# Patient Record
Sex: MALE | Race: WHITE | NOT HISPANIC OR LATINO | Employment: OTHER | URBAN - METROPOLITAN AREA
[De-identification: names, ages, dates, MRNs, and addresses within clinical notes are randomized per-mention and may not be internally consistent; named-entity substitution may affect disease eponyms.]

---

## 2017-01-03 ENCOUNTER — ALLSCRIPTS OFFICE VISIT (OUTPATIENT)
Dept: OTHER | Facility: OTHER | Age: 82
End: 2017-01-03

## 2017-01-03 ENCOUNTER — HOSPITAL ENCOUNTER (OUTPATIENT)
Dept: RADIOLOGY | Facility: CLINIC | Age: 82
Discharge: HOME | End: 2017-01-03
Payer: MEDICARE

## 2017-01-03 ENCOUNTER — TRANSCRIBE ORDERS (OUTPATIENT)
Dept: ADMINISTRATIVE | Facility: HOSPITAL | Age: 82
End: 2017-01-03

## 2017-01-03 DIAGNOSIS — M25.531 PAIN IN RIGHT WRIST: ICD-10-CM

## 2017-01-03 DIAGNOSIS — R20.2 PARESTHESIA: Primary | ICD-10-CM

## 2017-01-03 PROCEDURE — 73110 X-RAY EXAM OF WRIST: CPT

## 2017-02-01 ENCOUNTER — OFFICE VISIT (OUTPATIENT)
Dept: RADIOLOGY | Facility: CLINIC | Age: 82
End: 2017-02-01
Payer: MEDICARE

## 2017-02-01 DIAGNOSIS — R20.2 PARESTHESIA: ICD-10-CM

## 2017-02-01 PROCEDURE — 95886 MUSC TEST DONE W/N TEST COMP: CPT

## 2017-02-01 PROCEDURE — 95908 NRV CNDJ TST 3-4 STUDIES: CPT

## 2017-03-21 ENCOUNTER — ALLSCRIPTS OFFICE VISIT (OUTPATIENT)
Dept: OTHER | Facility: OTHER | Age: 82
End: 2017-03-21

## 2017-07-12 ENCOUNTER — TRANSCRIBE ORDERS (OUTPATIENT)
Dept: LAB | Facility: CLINIC | Age: 82
End: 2017-07-12

## 2017-07-12 ENCOUNTER — APPOINTMENT (OUTPATIENT)
Dept: LAB | Facility: CLINIC | Age: 82
End: 2017-07-12
Payer: MEDICARE

## 2017-07-12 DIAGNOSIS — E53.8 OTHER B-COMPLEX DEFICIENCIES: Primary | ICD-10-CM

## 2017-07-12 DIAGNOSIS — E53.8 OTHER B-COMPLEX DEFICIENCIES: ICD-10-CM

## 2017-07-12 LAB
FOLATE SERPL-MCNC: >20 NG/ML (ref 3.1–17.5)
VIT B12 SERPL-MCNC: 732 PG/ML (ref 100–900)

## 2017-07-12 PROCEDURE — 82607 VITAMIN B-12: CPT

## 2017-07-12 PROCEDURE — 36415 COLL VENOUS BLD VENIPUNCTURE: CPT

## 2017-07-12 PROCEDURE — 82746 ASSAY OF FOLIC ACID SERUM: CPT

## 2017-07-19 ENCOUNTER — ALLSCRIPTS OFFICE VISIT (OUTPATIENT)
Dept: OTHER | Facility: OTHER | Age: 82
End: 2017-07-19

## 2017-11-21 ENCOUNTER — TRANSCRIBE ORDERS (OUTPATIENT)
Dept: LAB | Facility: CLINIC | Age: 82
End: 2017-11-21

## 2017-11-21 ENCOUNTER — APPOINTMENT (OUTPATIENT)
Dept: LAB | Facility: CLINIC | Age: 82
End: 2017-11-21
Payer: MEDICARE

## 2017-11-21 DIAGNOSIS — D50.0 IRON DEFICIENCY ANEMIA DUE TO CHRONIC BLOOD LOSS: Primary | ICD-10-CM

## 2017-11-21 LAB
BASOPHILS # BLD AUTO: 0.01 THOUSANDS/ΜL (ref 0–0.1)
BASOPHILS NFR BLD AUTO: 0 % (ref 0–1)
EOSINOPHIL # BLD AUTO: 0.2 THOUSAND/ΜL (ref 0–0.61)
EOSINOPHIL NFR BLD AUTO: 4 % (ref 0–6)
ERYTHROCYTE [DISTWIDTH] IN BLOOD BY AUTOMATED COUNT: 18.4 % (ref 11.6–15.1)
HCT VFR BLD AUTO: 34.7 % (ref 36.5–49.3)
HGB BLD-MCNC: 12.1 G/DL (ref 12–17)
LYMPHOCYTES # BLD AUTO: 1.49 THOUSANDS/ΜL (ref 0.6–4.47)
LYMPHOCYTES NFR BLD AUTO: 29 % (ref 14–44)
MCH RBC QN AUTO: 39.5 PG (ref 26.8–34.3)
MCHC RBC AUTO-ENTMCNC: 34.9 G/DL (ref 31.4–37.4)
MCV RBC AUTO: 113 FL (ref 82–98)
MONOCYTES # BLD AUTO: 0.52 THOUSAND/ΜL (ref 0.17–1.22)
MONOCYTES NFR BLD AUTO: 10 % (ref 4–12)
NEUTROPHILS # BLD AUTO: 2.89 THOUSANDS/ΜL (ref 1.85–7.62)
NEUTS SEG NFR BLD AUTO: 57 % (ref 43–75)
NRBC BLD AUTO-RTO: 1 /100 WBCS
PLATELET # BLD AUTO: 211 THOUSANDS/UL (ref 149–390)
PMV BLD AUTO: 11.1 FL (ref 8.9–12.7)
RBC # BLD AUTO: 3.06 MILLION/UL (ref 3.88–5.62)
WBC # BLD AUTO: 5.15 THOUSAND/UL (ref 4.31–10.16)

## 2017-11-21 PROCEDURE — 36415 COLL VENOUS BLD VENIPUNCTURE: CPT

## 2017-11-21 PROCEDURE — 85025 COMPLETE CBC W/AUTO DIFF WBC: CPT

## 2018-01-11 NOTE — PROGRESS NOTES
Assessment  Assessed    1  Benign essential hypertension (401 1) (I10)   2  Hypothyroidism (244 9) (E03 9)   3  Hyperlipidemia (272 4) (E78 5)   4  Carpal tunnel syndrome, right (354 0) (G56 01)    Plan  #1 hypertension: Blood pressure controlled with current medications  Monitor electrolytes and renal function  #2 hypothyroidism; stable  Maintained on Synthroid  Monitor TSH  #3 hyperlipidemia; patient refuses statin  Continue fish oil    #4 right carpal tunnel syndrome; stable  Much improved after surgical repair  Discussion/Summary      Counseling Documentation With Imm: The patient was counseled regarding patient and family education, impressions, risks and benefits of treatment options, importance of compliance with treatment  total time of encounter was 40 minutes and 25 minutes was spent counseling  History of Present Illness  Hypothyroidism (Follow-Up): The patient reports doing well  He has had no significant interval events  The patient is currently asymptomatic  Medications include levothyroxine  Medications:  the patient is adherent to his medication regimen, but he denies medication side effects  Carpal Tunnel Syndrome (Brief): The patient is being seen for a routine clinic follow-up of carpal tunnel syndrome  Symptoms are in the right wrist  The patient is currently asymptomatic  No associated symptoms are reported  Hypertension (Follow-Up): The patient presents for follow-up of essential hypertension  The patient states he has been doing well with his blood pressure control since the last visit  He has no comorbid illnesses  He has no significant interval events  Symptoms: The patient is currently asymptomatic  Associated symptoms include no headache, no focal neurologic deficits and no memory loss  Medications: the patient is adherent with his medication regimen  He denies medication side effects  Medication(s): a beta blocking agent and a calcium channel blocker  Review of Systems  Complete-Male:   Constitutional: No fever or chills, feels well, no tiredness, no recent weight gain or weight loss  Eyes: No complaints of eye pain, no red eyes, no discharge from eyes, no itchy eyes  ENT: no complaints of earache, no hearing loss, no nosebleeds, no nasal discharge, no sore throat, no hoarseness  Cardiovascular: No complaints of slow heart rate, no fast heart rate, no chest pain, no palpitations, no leg claudication, no lower extremity  Respiratory: No complaints of shortness of breath, no wheezing, no cough, no SOB on exertion, no orthopnea or PND  Gastrointestinal: No complaints of abdominal pain, no constipation, no nausea or vomiting, no diarrhea or bloody stools  Genitourinary: No complaints of dysuria, no incontinence, no hesitancy, no nocturia, no genital lesion, no testicular pain  Musculoskeletal: No complaints of arthralgia, no myalgias, no joint swelling or stiffness, no limb pain or swelling  Integumentary: No complaints of skin rash or skin lesions, no itching, no skin wound, no dry skin  Neurological: No compliants of headache, no confusion, no convulsions, no numbness or tingling, no dizziness or fainting, no limb weakness, no difficulty walking  Psychiatric: Is not suicidal, no sleep disturbances, no anxiety or depression, no change in personality, no emotional problems  Endocrine: No complaints of proptosis, no hot flashes, no muscle weakness, no erectile dysfunction, no deepening of the voice, no feelings of weakness  Hematologic/Lymphatic: No complaints of swollen glands, no swollen glands in the neck, does not bleed easily, no easy bruising  Active Problems  Problems    1  Carpal tunnel syndrome, right (354 0) (G56 01)   2  Paresthesia of hand (782 0) (R20 2)   3  Right wrist pain (719 43) (M25 531)    Surgical History  Problems    1  History of Hernia Repair    Social History  Problems    · Never a smoker    Current Meds   1  AmLODIPine Besylate 5 MG Oral Tablet Recorded   2  Atenolol 50 MG Oral Tablet Recorded   3  Calcium 500 MG Oral Tablet Chewable; Therapy: [id number] to Recorded   4  Centrum Silver Adult 50+ Oral Tablet; Therapy: [id number] to Recorded   5  Garlic 7088 MG Oral Capsule; Therapy: 06ZBA2301 to Recorded   6  Magnesium 400 MG Oral Tablet; Therapy: 94KXY2906 to Recorded   7  Omega-3 Fish Oil 300 MG Oral Capsule; Therapy: 54XNY7571 to Recorded   8  Synthroid 75 MCG Oral Tablet Recorded   9  Vitamin C 500 MG Oral Tablet; Therapy: 49HXP3104 to Recorded   10  Vitamin E 400 UNIT Oral Capsule; Therapy: 57BUJ3672 to Recorded    Allergies  Medication    1  Penicillins    Vitals  Signs   Recorded: [id number] 02:12PM   Temperature: 97 5 F  Heart Rate: 58  Respiration: 16  Systolic: 409  Diastolic: 68  Height: 5 ft 8 in  Weight: 185 lb   BMI Calculated: 28 13  BSA Calculated: 1 98  O2 Saturation: 95    Physical Exam  General Complete Exam (Brief):   Constitutional   General appearance: No acute distress, well appearing and well nourished  Eyes   Conjunctiva and lids: No swelling, erythema, or discharge  Pupils and irises: Equal, round and reactive to light  Ears, Nose, Mouth, and Throat   External inspection of ears and nose: Normal     Otoscopic examination: Tympanic membrance translucent with normal light reflex  Canals patent without erythema  Oropharynx: Normal with no erythema, edema, exudate or lesions  Pulmonary   Respiratory effort: No increased work of breathing or signs of respiratory distress  Auscultation of lungs: Clear to auscultation  Cardiovascular   Auscultation of heart: Normal rate and rhythm, normal S1 and S2, without murmurs  Examination of extremities for edema and/or varicosities: Normal     Abdomen   Abdomen: Non-tender, no masses  Liver and spleen: No hepatomegaly or splenomegaly  Lymphatic   Palpation of lymph nodes in neck: No lymphadenopathy      Musculoskeletal Gait and station: Normal     Digits and nails: Normal without clubbing or cyanosis  Inspection/palpation of joints, bones, and muscles: Normal     Skin   Skin and subcutaneous tissue: Normal without rashes or lesions  Neurologic   Cranial nerves: Cranial nerves 2-12 intact  Reflexes: 2+ and symmetric  Sensation: No sensory loss      Psychiatric   Orientation to person, place and time: Normal        Signatures   Electronically signed by : Shelia Chatterjee DO; Jul 19 2017  4:31PM EST                       (Author)

## 2018-01-12 VITALS
BODY MASS INDEX: 26.52 KG/M2 | WEIGHT: 175 LBS | HEART RATE: 59 BPM | DIASTOLIC BLOOD PRESSURE: 72 MMHG | SYSTOLIC BLOOD PRESSURE: 123 MMHG | HEIGHT: 68 IN

## 2018-01-13 VITALS
OXYGEN SATURATION: 95 % | WEIGHT: 185 LBS | BODY MASS INDEX: 28.04 KG/M2 | SYSTOLIC BLOOD PRESSURE: 126 MMHG | RESPIRATION RATE: 16 BRPM | TEMPERATURE: 97.5 F | HEART RATE: 58 BPM | DIASTOLIC BLOOD PRESSURE: 68 MMHG | HEIGHT: 68 IN

## 2018-01-14 VITALS
HEIGHT: 68 IN | SYSTOLIC BLOOD PRESSURE: 131 MMHG | WEIGHT: 180 LBS | BODY MASS INDEX: 27.28 KG/M2 | DIASTOLIC BLOOD PRESSURE: 74 MMHG | HEART RATE: 59 BPM

## 2018-04-20 ENCOUNTER — OFFICE VISIT (OUTPATIENT)
Dept: UROLOGY | Facility: CLINIC | Age: 83
End: 2018-04-20
Payer: MEDICARE

## 2018-04-20 VITALS
SYSTOLIC BLOOD PRESSURE: 120 MMHG | DIASTOLIC BLOOD PRESSURE: 70 MMHG | BODY MASS INDEX: 30.22 KG/M2 | HEIGHT: 66 IN | WEIGHT: 188 LBS

## 2018-04-20 DIAGNOSIS — N40.0 BENIGN PROSTATIC HYPERPLASIA WITHOUT LOWER URINARY TRACT SYMPTOMS: Primary | ICD-10-CM

## 2018-04-20 LAB
SL AMB  POCT GLUCOSE, UA: NORMAL
SL AMB LEUKOCYTE ESTERASE,UA: NORMAL
SL AMB POCT BLOOD,UA: NORMAL
SL AMB POCT CLARITY,UA: CLEAR
SL AMB POCT COLOR,UA: YELLOW
SL AMB POCT KETONES,UA: NORMAL
SL AMB POCT NITRITE,UA: NORMAL
SL AMB POCT PH,UA: 7.5
SL AMB POCT URINE PROTEIN: NORMAL

## 2018-04-20 PROCEDURE — 81002 URINALYSIS NONAUTO W/O SCOPE: CPT | Performed by: UROLOGY

## 2018-04-20 PROCEDURE — 99213 OFFICE O/P EST LOW 20 MIN: CPT | Performed by: UROLOGY

## 2018-04-20 RX ORDER — FOLIC ACID/MULTIVIT,IRON,MINER 0.4MG-18MG
TABLET ORAL
COMMUNITY

## 2018-04-20 NOTE — PROGRESS NOTES
UROLOGY PROGRESS NOTE   Patient Identifiers: Holger Almonte (MRN [id number])  Date of Service: 4/20/2018    Subjective:    80year old male hx of BPH  Urine clear  AUA index score 8  Very minor urge leak  Only gets up once at night  Patient has  no complaints  Objective:     VITALS:    Vitals:    04/20/18 1026   BP: 120/70           LABS:  Lab Results   Component Value Date    HGB 12 1 11/21/2017    HCT 34 7 (L) 11/21/2017    WBC 5 15 11/21/2017     11/21/2017   ]    Lab Results   Component Value Date     02/29/2016    K 4 0 02/29/2016     02/29/2016    CO2 32 02/29/2016    BUN 10 02/29/2016    CREATININE 0 80 02/29/2016    CALCIUM 8 2 (L) 02/29/2016    GLUCOSE 106 02/29/2016   ]    INPATIENT MEDS:    Current Outpatient Prescriptions:     amLODIPine (NORVASC) 5 mg tablet, Take 5 mg by mouth every morning , Disp: , Rfl:     ascorbic acid (VITAMIN C) 500 mg tablet, Take 500 mg by mouth daily  , Disp: , Rfl:     atenolol (TENORMIN) 50 mg tablet, Take 50 mg by mouth every morning , Disp: , Rfl:     calcium-vitamin D (OSCAL) 250-125 MG-UNIT per tablet, Take 1 tablet by mouth daily  , Disp: , Rfl:     GARLIC PO, Take 1 tablet by mouth daily  , Disp: , Rfl:     levothyroxine 75 mcg tablet, Take 75 mcg by mouth daily  , Disp: , Rfl:     Magnesium 400 MG CAPS, Take 1 tablet by mouth , Disp: , Rfl:     Multiple Vitamins-Minerals (CENTRUM ADULTS PO), Take 1 tablet by mouth daily  , Disp: , Rfl:     Omega-3 Fatty Acids (OMEGA-3 FISH OIL) 1200 MG CAPS, Take by mouth, Disp: , Rfl:     vitamin E, tocopherol, 400 units capsule, Take 400 Units by mouth daily  , Disp: , Rfl:       Physical Exam:   /70 (BP Location: Right arm, Patient Position: Sitting, Cuff Size: Adult)   Ht 5' 6" (1 676 m)   Wt 85 3 kg (188 lb)   BMI 30 34 kg/m²   GEN: no acute distress    RESP: breathing comfortably with no accessory muscle use    ABD: soft, non-tender, non-distended   INCISION: `   EXT: no significant peripheral edema   (Male): Penis circumcised, phallus normal, meatus patent  Testicles descended into scrotum bilaterally without masses nor tenderness  No inguinal hernias bilaterally  LISSETTE: Prostate is not enlarged at 30 grams  The prostate is not boggy  The prostate is not tender  No nodules noted      RADIOLOGY:   none     Assessment:   1   BPH     Plan:   - follow up one year for annual exam  -  -  -

## 2018-05-15 ENCOUNTER — TRANSCRIBE ORDERS (OUTPATIENT)
Dept: LAB | Facility: CLINIC | Age: 83
End: 2018-05-15

## 2018-05-15 ENCOUNTER — APPOINTMENT (OUTPATIENT)
Dept: LAB | Facility: CLINIC | Age: 83
End: 2018-05-15
Payer: MEDICARE

## 2018-05-15 DIAGNOSIS — E03.4 IDIOPATHIC ATROPHIC HYPOTHYROIDISM: ICD-10-CM

## 2018-05-15 DIAGNOSIS — I10 ESSENTIAL HYPERTENSION, MALIGNANT: ICD-10-CM

## 2018-05-15 DIAGNOSIS — Z23 NEED FOR PROPHYLACTIC VACCINATION AND INOCULATION AGAINST CHOLERA ALONE: ICD-10-CM

## 2018-05-15 DIAGNOSIS — Z23 NEED FOR PROPHYLACTIC VACCINATION AND INOCULATION AGAINST CHOLERA ALONE: Primary | ICD-10-CM

## 2018-05-15 LAB
ALBUMIN SERPL BCP-MCNC: 3.8 G/DL (ref 3.5–5)
ALP SERPL-CCNC: 42 U/L (ref 46–116)
ALT SERPL W P-5'-P-CCNC: 30 U/L (ref 12–78)
ANION GAP SERPL CALCULATED.3IONS-SCNC: 8 MMOL/L (ref 4–13)
AST SERPL W P-5'-P-CCNC: 18 U/L (ref 5–45)
BASOPHILS # BLD AUTO: 0.01 THOUSANDS/ΜL (ref 0–0.1)
BASOPHILS NFR BLD AUTO: 0 % (ref 0–1)
BILIRUB SERPL-MCNC: 1.09 MG/DL (ref 0.2–1)
BUN SERPL-MCNC: 14 MG/DL (ref 5–25)
CALCIUM SERPL-MCNC: 8.6 MG/DL (ref 8.3–10.1)
CHLORIDE SERPL-SCNC: 103 MMOL/L (ref 100–108)
CHOLEST SERPL-MCNC: 119 MG/DL (ref 50–200)
CO2 SERPL-SCNC: 28 MMOL/L (ref 21–32)
CREAT SERPL-MCNC: 0.86 MG/DL (ref 0.6–1.3)
EOSINOPHIL # BLD AUTO: 0.52 THOUSAND/ΜL (ref 0–0.61)
EOSINOPHIL NFR BLD AUTO: 10 % (ref 0–6)
ERYTHROCYTE [DISTWIDTH] IN BLOOD BY AUTOMATED COUNT: 18.5 % (ref 11.6–15.1)
GFR SERPL CREATININE-BSD FRML MDRD: 77 ML/MIN/1.73SQ M
GLUCOSE P FAST SERPL-MCNC: 90 MG/DL (ref 65–99)
HCT VFR BLD AUTO: 36.3 % (ref 36.5–49.3)
HDLC SERPL-MCNC: 42 MG/DL (ref 40–60)
HGB BLD-MCNC: 12.1 G/DL (ref 12–17)
IMM GRANULOCYTES # BLD AUTO: 0.04 THOUSAND/UL (ref 0–0.2)
IMM GRANULOCYTES NFR BLD AUTO: 1 % (ref 0–2)
LDLC SERPL CALC-MCNC: 46 MG/DL (ref 0–100)
LYMPHOCYTES # BLD AUTO: 1.41 THOUSANDS/ΜL (ref 0.6–4.47)
LYMPHOCYTES NFR BLD AUTO: 26 % (ref 14–44)
MCH RBC QN AUTO: 38.4 PG (ref 26.8–34.3)
MCHC RBC AUTO-ENTMCNC: 33.3 G/DL (ref 31.4–37.4)
MCV RBC AUTO: 115 FL (ref 82–98)
MONOCYTES # BLD AUTO: 0.5 THOUSAND/ΜL (ref 0.17–1.22)
MONOCYTES NFR BLD AUTO: 9 % (ref 4–12)
NEUTROPHILS # BLD AUTO: 2.88 THOUSANDS/ΜL (ref 1.85–7.62)
NEUTS SEG NFR BLD AUTO: 54 % (ref 43–75)
NONHDLC SERPL-MCNC: 77 MG/DL
NRBC BLD AUTO-RTO: 1 /100 WBCS
PLATELET # BLD AUTO: 214 THOUSANDS/UL (ref 149–390)
PMV BLD AUTO: 11.2 FL (ref 8.9–12.7)
POTASSIUM SERPL-SCNC: 3.9 MMOL/L (ref 3.5–5.3)
PROT SERPL-MCNC: 6.9 G/DL (ref 6.4–8.2)
RBC # BLD AUTO: 3.15 MILLION/UL (ref 3.88–5.62)
SODIUM SERPL-SCNC: 139 MMOL/L (ref 136–145)
TRIGL SERPL-MCNC: 156 MG/DL
TSH SERPL DL<=0.05 MIU/L-ACNC: 5.81 UIU/ML (ref 0.36–3.74)
WBC # BLD AUTO: 5.36 THOUSAND/UL (ref 4.31–10.16)

## 2018-05-15 PROCEDURE — 85025 COMPLETE CBC W/AUTO DIFF WBC: CPT

## 2018-05-15 PROCEDURE — 84443 ASSAY THYROID STIM HORMONE: CPT

## 2018-05-15 PROCEDURE — 80053 COMPREHEN METABOLIC PANEL: CPT

## 2018-05-15 PROCEDURE — 36415 COLL VENOUS BLD VENIPUNCTURE: CPT

## 2018-05-15 PROCEDURE — 80061 LIPID PANEL: CPT

## 2018-07-10 ENCOUNTER — TRANSCRIBE ORDERS (OUTPATIENT)
Dept: LAB | Facility: CLINIC | Age: 83
End: 2018-07-10

## 2018-07-10 ENCOUNTER — APPOINTMENT (OUTPATIENT)
Dept: LAB | Facility: CLINIC | Age: 83
End: 2018-07-10
Payer: MEDICARE

## 2018-07-10 DIAGNOSIS — E03.9 MYXEDEMA HEART DISEASE: Primary | ICD-10-CM

## 2018-07-10 DIAGNOSIS — I51.9 MYXEDEMA HEART DISEASE: Primary | ICD-10-CM

## 2018-07-10 LAB — TSH SERPL DL<=0.05 MIU/L-ACNC: 2.34 UIU/ML (ref 0.36–3.74)

## 2018-07-10 PROCEDURE — 36415 COLL VENOUS BLD VENIPUNCTURE: CPT

## 2018-07-10 PROCEDURE — 84443 ASSAY THYROID STIM HORMONE: CPT

## 2018-10-02 ENCOUNTER — TELEPHONE (OUTPATIENT)
Dept: UROLOGY | Facility: CLINIC | Age: 83
End: 2018-10-02

## 2018-11-12 ENCOUNTER — TRANSCRIBE ORDERS (OUTPATIENT)
Dept: LAB | Facility: CLINIC | Age: 83
End: 2018-11-12

## 2018-11-12 ENCOUNTER — APPOINTMENT (OUTPATIENT)
Dept: LAB | Facility: CLINIC | Age: 83
End: 2018-11-12
Payer: MEDICARE

## 2018-11-12 DIAGNOSIS — Z51.81 ENCOUNTER FOR THERAPEUTIC DRUG MONITORING: ICD-10-CM

## 2018-11-12 DIAGNOSIS — E03.4 IDIOPATHIC ATROPHIC HYPOTHYROIDISM: Primary | ICD-10-CM

## 2018-11-12 DIAGNOSIS — I10 ESSENTIAL HYPERTENSION, MALIGNANT: ICD-10-CM

## 2018-11-12 DIAGNOSIS — E03.4 IDIOPATHIC ATROPHIC HYPOTHYROIDISM: ICD-10-CM

## 2018-11-12 LAB
ALBUMIN SERPL BCP-MCNC: 3.6 G/DL (ref 3.5–5)
ALP SERPL-CCNC: 39 U/L (ref 46–116)
ALT SERPL W P-5'-P-CCNC: 35 U/L (ref 12–78)
ANION GAP SERPL CALCULATED.3IONS-SCNC: 7 MMOL/L (ref 4–13)
AST SERPL W P-5'-P-CCNC: 18 U/L (ref 5–45)
BASOPHILS # BLD AUTO: 0.03 THOUSANDS/ΜL (ref 0–0.1)
BASOPHILS NFR BLD AUTO: 1 % (ref 0–1)
BILIRUB SERPL-MCNC: 0.9 MG/DL (ref 0.2–1)
BILIRUB UR QL STRIP: NEGATIVE
BUN SERPL-MCNC: 13 MG/DL (ref 5–25)
CALCIUM SERPL-MCNC: 8 MG/DL (ref 8.3–10.1)
CHLORIDE SERPL-SCNC: 103 MMOL/L (ref 100–108)
CHOLEST SERPL-MCNC: 119 MG/DL (ref 50–200)
CLARITY UR: NORMAL
CO2 SERPL-SCNC: 28 MMOL/L (ref 21–32)
COLOR UR: YELLOW
CREAT SERPL-MCNC: 0.77 MG/DL (ref 0.6–1.3)
EOSINOPHIL # BLD AUTO: 0.25 THOUSAND/ΜL (ref 0–0.61)
EOSINOPHIL NFR BLD AUTO: 6 % (ref 0–6)
ERYTHROCYTE [DISTWIDTH] IN BLOOD BY AUTOMATED COUNT: 18.1 % (ref 11.6–15.1)
GFR SERPL CREATININE-BSD FRML MDRD: 80 ML/MIN/1.73SQ M
GLUCOSE P FAST SERPL-MCNC: 103 MG/DL (ref 65–99)
GLUCOSE UR STRIP-MCNC: NEGATIVE MG/DL
HCT VFR BLD AUTO: 34.3 % (ref 36.5–49.3)
HDLC SERPL-MCNC: 40 MG/DL (ref 40–60)
HGB BLD-MCNC: 11.7 G/DL (ref 12–17)
HGB UR QL STRIP.AUTO: NEGATIVE
IMM GRANULOCYTES # BLD AUTO: 0.03 THOUSAND/UL (ref 0–0.2)
IMM GRANULOCYTES NFR BLD AUTO: 1 % (ref 0–2)
KETONES UR STRIP-MCNC: NEGATIVE MG/DL
LDLC SERPL CALC-MCNC: 47 MG/DL (ref 0–100)
LEUKOCYTE ESTERASE UR QL STRIP: NEGATIVE
LYMPHOCYTES # BLD AUTO: 1.19 THOUSANDS/ΜL (ref 0.6–4.47)
LYMPHOCYTES NFR BLD AUTO: 30 % (ref 14–44)
MCH RBC QN AUTO: 39.7 PG (ref 26.8–34.3)
MCHC RBC AUTO-ENTMCNC: 34.1 G/DL (ref 31.4–37.4)
MCV RBC AUTO: 116 FL (ref 82–98)
MONOCYTES # BLD AUTO: 0.44 THOUSAND/ΜL (ref 0.17–1.22)
MONOCYTES NFR BLD AUTO: 11 % (ref 4–12)
NEUTROPHILS # BLD AUTO: 2.04 THOUSANDS/ΜL (ref 1.85–7.62)
NEUTS SEG NFR BLD AUTO: 51 % (ref 43–75)
NITRITE UR QL STRIP: NEGATIVE
NONHDLC SERPL-MCNC: 79 MG/DL
NRBC BLD AUTO-RTO: 1 /100 WBCS
PH UR STRIP.AUTO: 7 [PH] (ref 4.5–8)
PLATELET # BLD AUTO: 189 THOUSANDS/UL (ref 149–390)
PMV BLD AUTO: 11.3 FL (ref 8.9–12.7)
POTASSIUM SERPL-SCNC: 3.9 MMOL/L (ref 3.5–5.3)
PROT SERPL-MCNC: 6.5 G/DL (ref 6.4–8.2)
PROT UR STRIP-MCNC: NEGATIVE MG/DL
RBC # BLD AUTO: 2.95 MILLION/UL (ref 3.88–5.62)
SODIUM SERPL-SCNC: 138 MMOL/L (ref 136–145)
SP GR UR STRIP.AUTO: 1.02 (ref 1–1.03)
TRIGL SERPL-MCNC: 160 MG/DL
TSH SERPL DL<=0.05 MIU/L-ACNC: 2.23 UIU/ML (ref 0.36–3.74)
UROBILINOGEN UR QL STRIP.AUTO: 0.2 E.U./DL
WBC # BLD AUTO: 3.98 THOUSAND/UL (ref 4.31–10.16)

## 2018-11-12 PROCEDURE — 36415 COLL VENOUS BLD VENIPUNCTURE: CPT

## 2018-11-12 PROCEDURE — 81003 URINALYSIS AUTO W/O SCOPE: CPT

## 2018-11-12 PROCEDURE — 80053 COMPREHEN METABOLIC PANEL: CPT

## 2018-11-12 PROCEDURE — 84443 ASSAY THYROID STIM HORMONE: CPT

## 2018-11-12 PROCEDURE — 85025 COMPLETE CBC W/AUTO DIFF WBC: CPT

## 2018-11-12 PROCEDURE — 80061 LIPID PANEL: CPT

## 2019-04-09 ENCOUNTER — TELEPHONE (OUTPATIENT)
Dept: UROLOGY | Facility: MEDICAL CENTER | Age: 84
End: 2019-04-09

## 2019-05-03 ENCOUNTER — OFFICE VISIT (OUTPATIENT)
Dept: UROLOGY | Facility: CLINIC | Age: 84
End: 2019-05-03
Payer: MEDICARE

## 2019-05-03 VITALS
BODY MASS INDEX: 28.64 KG/M2 | HEART RATE: 60 BPM | HEIGHT: 68 IN | DIASTOLIC BLOOD PRESSURE: 68 MMHG | WEIGHT: 189 LBS | SYSTOLIC BLOOD PRESSURE: 124 MMHG

## 2019-05-03 DIAGNOSIS — R39.12 BENIGN PROSTATIC HYPERPLASIA WITH WEAK URINARY STREAM: ICD-10-CM

## 2019-05-03 DIAGNOSIS — N40.1 BENIGN PROSTATIC HYPERPLASIA WITH WEAK URINARY STREAM: ICD-10-CM

## 2019-05-03 DIAGNOSIS — N48.1 BALANITIS: Primary | ICD-10-CM

## 2019-05-03 PROCEDURE — 99213 OFFICE O/P EST LOW 20 MIN: CPT | Performed by: UROLOGY

## 2019-05-03 RX ORDER — CLOTRIMAZOLE AND BETAMETHASONE DIPROPIONATE 10; .64 MG/G; MG/G
CREAM TOPICAL 2 TIMES DAILY
Qty: 30 G | Refills: 1 | Status: SHIPPED | OUTPATIENT
Start: 2019-05-03 | End: 2020-06-23

## 2019-07-15 ENCOUNTER — TRANSCRIBE ORDERS (OUTPATIENT)
Dept: LAB | Facility: CLINIC | Age: 84
End: 2019-07-15

## 2019-07-15 ENCOUNTER — APPOINTMENT (OUTPATIENT)
Dept: LAB | Facility: CLINIC | Age: 84
End: 2019-07-15
Payer: MEDICARE

## 2019-07-15 DIAGNOSIS — Z51.81 ENCOUNTER FOR THERAPEUTIC DRUG MONITORING: ICD-10-CM

## 2019-07-15 DIAGNOSIS — E03.4 IDIOPATHIC ATROPHIC HYPOTHYROIDISM: ICD-10-CM

## 2019-07-15 DIAGNOSIS — I20.0 INTERMEDIATE CORONARY SYNDROME (HCC): Primary | ICD-10-CM

## 2019-07-15 DIAGNOSIS — I20.0 INTERMEDIATE CORONARY SYNDROME (HCC): ICD-10-CM

## 2019-07-15 LAB
BASOPHILS # BLD AUTO: 0.02 THOUSANDS/ΜL (ref 0–0.1)
BASOPHILS NFR BLD AUTO: 0 % (ref 0–1)
BILIRUB UR QL STRIP: NEGATIVE
CHOLEST SERPL-MCNC: 143 MG/DL (ref 50–200)
CLARITY UR: CLEAR
COLOR UR: NORMAL
EOSINOPHIL # BLD AUTO: 0.22 THOUSAND/ΜL (ref 0–0.61)
EOSINOPHIL NFR BLD AUTO: 4 % (ref 0–6)
ERYTHROCYTE [DISTWIDTH] IN BLOOD BY AUTOMATED COUNT: 18.2 % (ref 11.6–15.1)
GLUCOSE UR STRIP-MCNC: NEGATIVE MG/DL
HCT VFR BLD AUTO: 34.9 % (ref 36.5–49.3)
HDLC SERPL-MCNC: 51 MG/DL (ref 40–60)
HGB BLD-MCNC: 12.3 G/DL (ref 12–17)
HGB UR QL STRIP.AUTO: NEGATIVE
IMM GRANULOCYTES # BLD AUTO: 0.04 THOUSAND/UL (ref 0–0.2)
IMM GRANULOCYTES NFR BLD AUTO: 1 % (ref 0–2)
KETONES UR STRIP-MCNC: NEGATIVE MG/DL
LDLC SERPL CALC-MCNC: 67 MG/DL (ref 0–100)
LEUKOCYTE ESTERASE UR QL STRIP: NEGATIVE
LYMPHOCYTES # BLD AUTO: 1.37 THOUSANDS/ΜL (ref 0.6–4.47)
LYMPHOCYTES NFR BLD AUTO: 28 % (ref 14–44)
MCH RBC QN AUTO: 41 PG (ref 26.8–34.3)
MCHC RBC AUTO-ENTMCNC: 35.2 G/DL (ref 31.4–37.4)
MCV RBC AUTO: 116 FL (ref 82–98)
MONOCYTES # BLD AUTO: 0.37 THOUSAND/ΜL (ref 0.17–1.22)
MONOCYTES NFR BLD AUTO: 7 % (ref 4–12)
NEUTROPHILS # BLD AUTO: 2.95 THOUSANDS/ΜL (ref 1.85–7.62)
NEUTS SEG NFR BLD AUTO: 60 % (ref 43–75)
NITRITE UR QL STRIP: NEGATIVE
NONHDLC SERPL-MCNC: 92 MG/DL
NRBC BLD AUTO-RTO: 1 /100 WBCS
PH UR STRIP.AUTO: 7.5 [PH]
PLATELET # BLD AUTO: 184 THOUSANDS/UL (ref 149–390)
PMV BLD AUTO: 11.7 FL (ref 8.9–12.7)
PROT UR STRIP-MCNC: NEGATIVE MG/DL
RBC # BLD AUTO: 3 MILLION/UL (ref 3.88–5.62)
SP GR UR STRIP.AUTO: 1.02 (ref 1–1.03)
TRIGL SERPL-MCNC: 127 MG/DL
TSH SERPL DL<=0.05 MIU/L-ACNC: 3.19 UIU/ML (ref 0.36–3.74)
UROBILINOGEN UR QL STRIP.AUTO: 0.2 E.U./DL
WBC # BLD AUTO: 4.97 THOUSAND/UL (ref 4.31–10.16)

## 2019-07-15 PROCEDURE — 85025 COMPLETE CBC W/AUTO DIFF WBC: CPT

## 2019-07-15 PROCEDURE — 80061 LIPID PANEL: CPT

## 2019-07-15 PROCEDURE — 36415 COLL VENOUS BLD VENIPUNCTURE: CPT

## 2019-07-15 PROCEDURE — 84443 ASSAY THYROID STIM HORMONE: CPT

## 2019-07-15 PROCEDURE — 81003 URINALYSIS AUTO W/O SCOPE: CPT

## 2019-11-15 ENCOUNTER — APPOINTMENT (OUTPATIENT)
Dept: LAB | Facility: CLINIC | Age: 84
End: 2019-11-15
Payer: MEDICARE

## 2019-11-15 ENCOUNTER — TRANSCRIBE ORDERS (OUTPATIENT)
Dept: LAB | Facility: CLINIC | Age: 84
End: 2019-11-15

## 2019-11-15 DIAGNOSIS — E03.9 HYPOTHYROIDISM, UNSPECIFIED TYPE: ICD-10-CM

## 2019-11-15 DIAGNOSIS — Z51.81 ENCOUNTER FOR THERAPEUTIC DRUG MONITORING: ICD-10-CM

## 2019-11-15 DIAGNOSIS — I10 ESSENTIAL HYPERTENSION, MALIGNANT: ICD-10-CM

## 2019-11-15 DIAGNOSIS — E03.9 HYPOTHYROIDISM, UNSPECIFIED TYPE: Primary | ICD-10-CM

## 2019-11-15 LAB
ALBUMIN SERPL BCP-MCNC: 3.8 G/DL (ref 3.5–5)
ALP SERPL-CCNC: 46 U/L (ref 46–116)
ALT SERPL W P-5'-P-CCNC: 33 U/L (ref 12–78)
ANION GAP SERPL CALCULATED.3IONS-SCNC: 5 MMOL/L (ref 4–13)
AST SERPL W P-5'-P-CCNC: 18 U/L (ref 5–45)
BASOPHILS # BLD AUTO: 0.03 THOUSANDS/ΜL (ref 0–0.1)
BASOPHILS NFR BLD AUTO: 1 % (ref 0–1)
BILIRUB SERPL-MCNC: 0.91 MG/DL (ref 0.2–1)
BILIRUB UR QL STRIP: NEGATIVE
BUN SERPL-MCNC: 11 MG/DL (ref 5–25)
CALCIUM SERPL-MCNC: 8.7 MG/DL (ref 8.3–10.1)
CHLORIDE SERPL-SCNC: 104 MMOL/L (ref 100–108)
CHOLEST SERPL-MCNC: 132 MG/DL (ref 50–200)
CLARITY UR: CLEAR
CO2 SERPL-SCNC: 30 MMOL/L (ref 21–32)
COLOR UR: YELLOW
CREAT SERPL-MCNC: 0.74 MG/DL (ref 0.6–1.3)
EOSINOPHIL # BLD AUTO: 0.26 THOUSAND/ΜL (ref 0–0.61)
EOSINOPHIL NFR BLD AUTO: 5 % (ref 0–6)
ERYTHROCYTE [DISTWIDTH] IN BLOOD BY AUTOMATED COUNT: 18.1 % (ref 11.6–15.1)
GFR SERPL CREATININE-BSD FRML MDRD: 81 ML/MIN/1.73SQ M
GLUCOSE P FAST SERPL-MCNC: 102 MG/DL (ref 65–99)
GLUCOSE UR STRIP-MCNC: NEGATIVE MG/DL
HCT VFR BLD AUTO: 38 % (ref 36.5–49.3)
HDLC SERPL-MCNC: 45 MG/DL
HGB BLD-MCNC: 12.9 G/DL (ref 12–17)
HGB UR QL STRIP.AUTO: NEGATIVE
IMM GRANULOCYTES # BLD AUTO: 0.05 THOUSAND/UL (ref 0–0.2)
IMM GRANULOCYTES NFR BLD AUTO: 1 % (ref 0–2)
KETONES UR STRIP-MCNC: NEGATIVE MG/DL
LDLC SERPL CALC-MCNC: 61 MG/DL (ref 0–100)
LEUKOCYTE ESTERASE UR QL STRIP: NEGATIVE
LYMPHOCYTES # BLD AUTO: 1.32 THOUSANDS/ΜL (ref 0.6–4.47)
LYMPHOCYTES NFR BLD AUTO: 23 % (ref 14–44)
MCH RBC QN AUTO: 40.2 PG (ref 26.8–34.3)
MCHC RBC AUTO-ENTMCNC: 33.9 G/DL (ref 31.4–37.4)
MCV RBC AUTO: 118 FL (ref 82–98)
MONOCYTES # BLD AUTO: 0.47 THOUSAND/ΜL (ref 0.17–1.22)
MONOCYTES NFR BLD AUTO: 8 % (ref 4–12)
NEUTROPHILS # BLD AUTO: 3.68 THOUSANDS/ΜL (ref 1.85–7.62)
NEUTS SEG NFR BLD AUTO: 62 % (ref 43–75)
NITRITE UR QL STRIP: NEGATIVE
NONHDLC SERPL-MCNC: 87 MG/DL
NRBC BLD AUTO-RTO: 1 /100 WBCS
PH UR STRIP.AUTO: 8 [PH]
PLATELET # BLD AUTO: 199 THOUSANDS/UL (ref 149–390)
PMV BLD AUTO: 11.4 FL (ref 8.9–12.7)
POTASSIUM SERPL-SCNC: 4.1 MMOL/L (ref 3.5–5.3)
PROT SERPL-MCNC: 6.8 G/DL (ref 6.4–8.2)
PROT UR STRIP-MCNC: NEGATIVE MG/DL
RBC # BLD AUTO: 3.21 MILLION/UL (ref 3.88–5.62)
SODIUM SERPL-SCNC: 139 MMOL/L (ref 136–145)
SP GR UR STRIP.AUTO: 1.01 (ref 1–1.03)
TRIGL SERPL-MCNC: 131 MG/DL
TSH SERPL DL<=0.05 MIU/L-ACNC: 3.17 UIU/ML (ref 0.36–3.74)
UROBILINOGEN UR QL STRIP.AUTO: 0.2 E.U./DL
WBC # BLD AUTO: 5.81 THOUSAND/UL (ref 4.31–10.16)

## 2019-11-15 PROCEDURE — 80053 COMPREHEN METABOLIC PANEL: CPT

## 2019-11-15 PROCEDURE — 85025 COMPLETE CBC W/AUTO DIFF WBC: CPT

## 2019-11-15 PROCEDURE — 80061 LIPID PANEL: CPT

## 2019-11-15 PROCEDURE — 81003 URINALYSIS AUTO W/O SCOPE: CPT

## 2019-11-15 PROCEDURE — 84443 ASSAY THYROID STIM HORMONE: CPT

## 2019-11-15 PROCEDURE — 36415 COLL VENOUS BLD VENIPUNCTURE: CPT

## 2019-12-17 ENCOUNTER — OFFICE VISIT (OUTPATIENT)
Dept: UROLOGY | Facility: CLINIC | Age: 84
End: 2019-12-17
Payer: MEDICARE

## 2019-12-17 VITALS
WEIGHT: 188 LBS | SYSTOLIC BLOOD PRESSURE: 124 MMHG | HEART RATE: 64 BPM | HEIGHT: 68 IN | BODY MASS INDEX: 28.49 KG/M2 | DIASTOLIC BLOOD PRESSURE: 70 MMHG

## 2019-12-17 DIAGNOSIS — R39.15 URINARY URGENCY: Primary | ICD-10-CM

## 2019-12-17 DIAGNOSIS — R39.12 BENIGN PROSTATIC HYPERPLASIA WITH WEAK URINARY STREAM: ICD-10-CM

## 2019-12-17 DIAGNOSIS — N40.1 BENIGN PROSTATIC HYPERPLASIA WITH WEAK URINARY STREAM: ICD-10-CM

## 2019-12-17 PROCEDURE — 99213 OFFICE O/P EST LOW 20 MIN: CPT | Performed by: UROLOGY

## 2020-01-24 ENCOUNTER — TELEPHONE (OUTPATIENT)
Dept: DERMATOLOGY | Facility: CLINIC | Age: 85
End: 2020-01-24

## 2020-07-28 ENCOUNTER — TELEPHONE (OUTPATIENT)
Dept: UROLOGY | Facility: MEDICAL CENTER | Age: 85
End: 2020-07-28

## 2020-07-28 NOTE — TELEPHONE ENCOUNTER
This is a patient of Dr Mary Miller in [place]  Patient called and wants to see Melissa Pearce in [place] and I do not see any openings until October  Patient said he does not want to wait til  October  Please call patient back at [phone]

## 2020-09-21 NOTE — PROGRESS NOTES
UROLOGY PROGRESS NOTE   Patient Identifiers: Alejandro Che (MRN [id number])  Date of Service: 9/21/2020    Subjective:    80-year-old man with a history of BPH and urinary frequency  At his last visit he was complaining of some increased urinary symptoms and urgency and the feels that he needs to void  He had 1-2 times per night nocturia  No medications were added at that time  Reason for visit:  BPH follow-up    Objective:     VITALS:    There were no vitals filed for this visit  LABS:  Lab Results   Component Value Date    HGB 12 9 11/15/2019    HCT 38 0 11/15/2019    WBC 5 81 11/15/2019     11/15/2019   ]    Lab Results   Component Value Date    K 4 1 11/15/2019     11/15/2019    CO2 30 11/15/2019    BUN 11 11/15/2019    CREATININE 0 74 11/15/2019    CALCIUM 8 7 11/15/2019   ]        INPATIENT MEDS:    Current Outpatient Medications:     amLODIPine (NORVASC) 5 mg tablet, Take 5 mg by mouth every morning , Disp: , Rfl:     ascorbic acid (VITAMIN C) 500 mg tablet, Take 500 mg by mouth daily  , Disp: , Rfl:     atenolol (TENORMIN) 50 mg tablet, Take 50 mg by mouth every morning , Disp: , Rfl:     betamethasone valerate (VALISONE) 0 1 % cream, Apply topically 2 (two) times a day, Disp: 45 g, Rfl: 1    calcium-vitamin D (OSCAL) 250-125 MG-UNIT per tablet, Take 1 tablet by mouth daily  , Disp: , Rfl:     clotrimazole-betamethasone (LOTRISONE) 1-0 05 % cream, Apply topically 2 (two) times a day for 5 days, Disp: 30 g, Rfl: 1    fluticasone (FLONASE) 50 mcg/act nasal spray, 1 spray into each nostril daily, Disp: , Rfl:     GARLIC PO, Take 1 tablet by mouth daily  , Disp: , Rfl:     levothyroxine 75 mcg tablet, Take 75 mcg by mouth daily  , Disp: , Rfl:     Magnesium 400 MG CAPS, Take 1 tablet by mouth , Disp: , Rfl:     Multiple Vitamins-Minerals (CENTRUM ADULTS PO), Take 1 tablet by mouth daily  , Disp: , Rfl:     Omega-3 Fatty Acids (OMEGA-3 FISH OIL) 1200 MG CAPS, Take by mouth, Disp: , Rfl:     vitamin E, tocopherol, 400 units capsule, Take 400 Units by mouth daily  , Disp: , Rfl:       Physical Exam:   There were no vitals taken for this visit  GEN: no acute distress    RESP: breathing comfortably with no accessory muscle use    ABD: soft, non-tender, non-distended   INCISION:    EXT: no significant peripheral edema   (Male): Penis uncircumcised, phallus normal, meatus patent  Testicles descended into scrotum bilaterally without masses nor tenderness  No inguinal hernias bilaterally  LISSETTE: Prostate is not enlarged at 30 grams  The prostate is not boggy  The prostate is not tender  No nodules noted      RADIOLOGY:   None     Assessment:   1   BPH     Plan:   -he prefers to follow-up in 1 year for his annual exam  -  -  -

## 2020-09-22 ENCOUNTER — OFFICE VISIT (OUTPATIENT)
Dept: UROLOGY | Facility: CLINIC | Age: 85
End: 2020-09-22
Payer: MEDICARE

## 2020-09-22 VITALS
HEART RATE: 68 BPM | RESPIRATION RATE: 18 BRPM | TEMPERATURE: 97.9 F | DIASTOLIC BLOOD PRESSURE: 80 MMHG | HEIGHT: 69 IN | SYSTOLIC BLOOD PRESSURE: 144 MMHG | BODY MASS INDEX: 27.11 KG/M2 | WEIGHT: 183 LBS

## 2020-09-22 DIAGNOSIS — R39.12 BENIGN PROSTATIC HYPERPLASIA WITH WEAK URINARY STREAM: Primary | ICD-10-CM

## 2020-09-22 DIAGNOSIS — N40.1 BENIGN PROSTATIC HYPERPLASIA WITH WEAK URINARY STREAM: Primary | ICD-10-CM

## 2020-09-22 LAB — POST-VOID RESIDUAL VOLUME, ML POC: 16 ML

## 2020-09-22 PROCEDURE — 99213 OFFICE O/P EST LOW 20 MIN: CPT | Performed by: PHYSICIAN ASSISTANT

## 2020-09-22 PROCEDURE — 51798 US URINE CAPACITY MEASURE: CPT | Performed by: PHYSICIAN ASSISTANT

## 2020-09-29 ENCOUNTER — IMMUNIZATIONS (OUTPATIENT)
Dept: GERIATRICS | Facility: OTHER | Age: 85
End: 2020-09-29
Payer: MEDICARE

## 2020-09-29 DIAGNOSIS — Z23 ENCOUNTER FOR IMMUNIZATION: ICD-10-CM

## 2020-09-29 PROCEDURE — 90662 IIV NO PRSV INCREASED AG IM: CPT | Performed by: FAMILY MEDICINE

## 2020-09-29 PROCEDURE — G0008 ADMIN INFLUENZA VIRUS VAC: HCPCS | Performed by: FAMILY MEDICINE

## 2021-02-09 ENCOUNTER — IMMUNIZATIONS (OUTPATIENT)
Dept: FAMILY MEDICINE CLINIC | Facility: HOSPITAL | Age: 86
End: 2021-02-09

## 2021-02-09 DIAGNOSIS — Z23 ENCOUNTER FOR IMMUNIZATION: Primary | ICD-10-CM

## 2021-02-09 PROCEDURE — 91301 SARS-COV-2 / COVID-19 MRNA VACCINE (MODERNA) 100 MCG: CPT

## 2021-02-09 PROCEDURE — 0011A SARS-COV-2 / COVID-19 MRNA VACCINE (MODERNA) 100 MCG: CPT

## 2021-03-09 ENCOUNTER — IMMUNIZATIONS (OUTPATIENT)
Dept: FAMILY MEDICINE CLINIC | Facility: HOSPITAL | Age: 86
End: 2021-03-09

## 2021-03-09 DIAGNOSIS — Z23 ENCOUNTER FOR IMMUNIZATION: Primary | ICD-10-CM

## 2021-03-09 PROCEDURE — 91301 SARS-COV-2 / COVID-19 MRNA VACCINE (MODERNA) 100 MCG: CPT

## 2021-03-09 PROCEDURE — 0012A SARS-COV-2 / COVID-19 MRNA VACCINE (MODERNA) 100 MCG: CPT

## 2021-09-22 ENCOUNTER — OFFICE VISIT (OUTPATIENT)
Dept: UROLOGY | Facility: CLINIC | Age: 86
End: 2021-09-22
Payer: MEDICARE

## 2021-09-22 VITALS
HEART RATE: 59 BPM | DIASTOLIC BLOOD PRESSURE: 80 MMHG | BODY MASS INDEX: 27.99 KG/M2 | SYSTOLIC BLOOD PRESSURE: 118 MMHG | HEIGHT: 69 IN | WEIGHT: 189 LBS

## 2021-09-22 DIAGNOSIS — N40.1 BENIGN PROSTATIC HYPERPLASIA WITH LOWER URINARY TRACT SYMPTOMS, SYMPTOM DETAILS UNSPECIFIED: Primary | ICD-10-CM

## 2021-09-22 PROCEDURE — 99213 OFFICE O/P EST LOW 20 MIN: CPT | Performed by: PHYSICIAN ASSISTANT

## 2021-09-22 NOTE — PROGRESS NOTES
UROLOGY PROGRESS NOTE   Patient Identifiers: Briseida Ferrell (MRN [id number])  Date of Service: 9/22/2021    Subjective:      45-year-old man history of BPH and urinary frequency  He only gets up 1-2 times per night  He has some urgency with minor urge leak during day but otherwise no complaints  Reason for visit:  BPH follow-up      Objective:     VITALS:    Vitals:    09/22/21 1348   BP: 118/80   Pulse: 59     AUA SYMPTOM SCORE      Most Recent Value   AUA SYMPTOM SCORE   How often have you had a sensation of not emptying your bladder completely after you finished urinating? 2   How often have you had to urinate again less than two hours after you finished urinating? 4   How often have you found you stopped and started again several times when you urinate? 3   How often have you found it difficult to postpone urination? 3   How often have you had a weak urinary stream?  1   How often have you had to push or strain to begin urination? 0   How many times did you most typically get up to urinate from the time you went to bed at night until the time you got up in the morning? 2   Quality of Life: If you were to spend the rest of your life with your urinary condition just the way it is now, how would you feel about that?  3   AUA SYMPTOM SCORE  15            LABS:  Lab Results   Component Value Date    HGB 12 9 11/15/2019    HCT 38 0 11/15/2019    WBC 5 81 11/15/2019     11/15/2019   ]    Lab Results   Component Value Date    K 4 1 11/15/2019     11/15/2019    CO2 30 11/15/2019    BUN 11 11/15/2019    CREATININE 0 74 11/15/2019    CALCIUM 8 7 11/15/2019   ]        INPATIENT MEDS:    Current Outpatient Medications:     amLODIPine (NORVASC) 5 mg tablet, Take 5 mg by mouth every morning , Disp: , Rfl:     ascorbic acid (VITAMIN C) 500 mg tablet, Take 500 mg by mouth daily  , Disp: , Rfl:     atenolol (TENORMIN) 50 mg tablet, Take 50 mg by mouth every morning , Disp: , Rfl:     betamethasone valerate (VALISONE) 0 1 % cream, Apply topically 2 (two) times a day, Disp: 45 g, Rfl: 1    calcium-vitamin D (OSCAL) 250-125 MG-UNIT per tablet, Take 1 tablet by mouth daily  , Disp: , Rfl:     fluticasone (FLONASE) 50 mcg/act nasal spray, 1 spray into each nostril daily, Disp: , Rfl:     GARLIC PO, Take 1 tablet by mouth daily  , Disp: , Rfl:     levothyroxine 75 mcg tablet, Take 75 mcg by mouth daily  , Disp: , Rfl:     Magnesium 400 MG CAPS, Take 1 tablet by mouth , Disp: , Rfl:     Multiple Vitamins-Minerals (CENTRUM ADULTS PO), Take 1 tablet by mouth daily  , Disp: , Rfl:     Omega-3 Fatty Acids (OMEGA-3 FISH OIL) 1200 MG CAPS, Take by mouth, Disp: , Rfl:     vitamin E, tocopherol, 400 units capsule, Take 400 Units by mouth daily  , Disp: , Rfl:     clotrimazole-betamethasone (LOTRISONE) 1-0 05 % cream, Apply topically 2 (two) times a day for 5 days, Disp: 30 g, Rfl: 1      Physical Exam:   /80 (BP Location: Left arm, Patient Position: Sitting, Cuff Size: Adult)   Pulse 59   Ht 5' 9" (1 753 m)   Wt 85 7 kg (189 lb)   BMI 27 91 kg/m²   GEN: no acute distress    RESP: breathing comfortably with no accessory muscle use    ABD: soft, non-tender, non-distended   INCISION:    EXT: no significant peripheral edema   (Male): Penis uncircumcised, phallus normal, meatus patent  Testicles descended into scrotum bilaterally without masses nor tenderness  No inguinal hernias bilaterally  LISSETTE: Prostate is enlarged at 35 grams  The prostate is not boggy  The prostate is not tender  No nodules noted      RADIOLOGY:    none     Assessment:    1   BPH     Plan:   - follow-up in 1 year for his annual exam  - he is not on any medications  -  -

## 2021-10-08 ENCOUNTER — IMMUNIZATIONS (OUTPATIENT)
Dept: GERIATRICS | Facility: OTHER | Age: 86
End: 2021-10-08
Payer: MEDICARE

## 2021-10-08 DIAGNOSIS — Z23 ENCOUNTER FOR IMMUNIZATION: Primary | ICD-10-CM

## 2021-10-08 PROCEDURE — 90662 IIV NO PRSV INCREASED AG IM: CPT | Performed by: NURSE PRACTITIONER

## 2021-10-08 PROCEDURE — G0008 ADMIN INFLUENZA VIRUS VAC: HCPCS | Performed by: NURSE PRACTITIONER

## 2022-09-06 ENCOUNTER — OFFICE VISIT (OUTPATIENT)
Dept: UROLOGY | Facility: CLINIC | Age: 87
End: 2022-09-06
Payer: MEDICARE

## 2022-09-06 VITALS
OXYGEN SATURATION: 95 % | HEIGHT: 69 IN | BODY MASS INDEX: 27.7 KG/M2 | SYSTOLIC BLOOD PRESSURE: 128 MMHG | DIASTOLIC BLOOD PRESSURE: 68 MMHG | HEART RATE: 65 BPM | WEIGHT: 187 LBS

## 2022-09-06 DIAGNOSIS — N40.1 BPH WITH OBSTRUCTION/LOWER URINARY TRACT SYMPTOMS: Primary | ICD-10-CM

## 2022-09-06 DIAGNOSIS — N13.8 BPH WITH OBSTRUCTION/LOWER URINARY TRACT SYMPTOMS: Primary | ICD-10-CM

## 2022-09-06 PROCEDURE — 99213 OFFICE O/P EST LOW 20 MIN: CPT | Performed by: PHYSICIAN ASSISTANT

## 2022-09-06 RX ORDER — TAMSULOSIN HYDROCHLORIDE 0.4 MG/1
0.4 CAPSULE ORAL
Qty: 90 CAPSULE | Refills: 3 | Status: SHIPPED | OUTPATIENT
Start: 2022-09-06

## 2022-09-06 NOTE — PROGRESS NOTES
UROLOGY PROGRESS NOTE   Patient Identifiers: Tip Masterson (MRN [id number])  Date of Service: 9/6/2022    Subjective:    26-year-old man history of BPH with urinary frequency  He gets up 1-2 times per night has some minor leakage during the day  Reason for visit:  BPH follow-up    Objective:     VITALS:    Vitals:    09/06/22 1116   BP: 128/68   Pulse: 65   SpO2: 95%           LABS:  Lab Results   Component Value Date    HGB 12 9 11/15/2019    HCT 38 0 11/15/2019    WBC 5 81 11/15/2019     11/15/2019   ]    Lab Results   Component Value Date    K 4 1 11/15/2019     11/15/2019    CO2 30 11/15/2019    BUN 11 11/15/2019    CREATININE 0 74 11/15/2019    CALCIUM 8 7 11/15/2019   ]        INPATIENT MEDS:    Current Outpatient Medications:     amLODIPine (NORVASC) 5 mg tablet, Take 5 mg by mouth every morning , Disp: , Rfl:     ascorbic acid (VITAMIN C) 500 mg tablet, Take 500 mg by mouth daily  , Disp: , Rfl:     atenolol (TENORMIN) 50 mg tablet, Take 50 mg by mouth every morning , Disp: , Rfl:     betamethasone valerate (VALISONE) 0 1 % cream, Apply topically 2 (two) times a day, Disp: 45 g, Rfl: 1    calcium-vitamin D (OSCAL) 250-125 MG-UNIT per tablet, Take 1 tablet by mouth daily  , Disp: , Rfl:     Cholecalciferol 25 MCG (1000 UT) tablet, Take 1,000 Units by mouth daily, Disp: , Rfl:     Diclofenac Sodium (VOLTAREN) 1 %, Apply 1 application topically 4 (four) times a day, Disp: , Rfl:     fluticasone (FLONASE) 50 mcg/act nasal spray, 1 spray into each nostril daily As needed , Disp: , Rfl:     GARLIC PO, Take 1 tablet by mouth daily  , Disp: , Rfl:     Magnesium 400 MG CAPS, Take 1 tablet by mouth , Disp: , Rfl:     Multiple Vitamins-Minerals (CENTRUM ADULTS PO), Take 1 tablet by mouth daily  , Disp: , Rfl:     Omega-3 Fatty Acids (OMEGA-3 FISH OIL) 1200 MG CAPS, Take by mouth, Disp: , Rfl:     Synthroid 112 MCG tablet, , Disp: , Rfl:     tamsulosin (FLOMAX) 0 4 mg, Take 1 capsule (0 4 mg total) by mouth daily with dinner, Disp: 90 capsule, Rfl: 3    vitamin E, tocopherol, 400 units capsule, Take 400 Units by mouth daily  , Disp: , Rfl:     clotrimazole-betamethasone (LOTRISONE) 1-0 05 % cream, Apply topically 2 (two) times a day for 5 days, Disp: 30 g, Rfl: 1    levothyroxine 75 mcg tablet, Take 75 mcg by mouth daily  (Patient not taking: No sig reported), Disp: , Rfl:       Physical Exam:   /68   Pulse 65   Ht 5' 9" (1 753 m)   Wt 84 8 kg (187 lb)   SpO2 95%   BMI 27 62 kg/m²   GEN: no acute distress    RESP: breathing comfortably with no accessory muscle use    ABD: soft, non-tender, non-distended   INCISION:    EXT: no significant peripheral edema   (Male): Penis circumcised, phallus normal, meatus patent  Testicles descended into scrotum bilaterally without masses nor tenderness  No inguinal hernias bilaterally  LISSETTE: Prostate is enlarged at 35 grams  The prostate is not boggy  The prostate is not tender  No nodules noted    RADIOLOGY:   None     Assessment:   1   BPH     Plan:   -he would like to try something for his frequency and nocturia  -with a warning I started him on tamsulosin  -follow-up in 1 year for his annual exam  -

## 2023-07-26 ENCOUNTER — OFFICE VISIT (OUTPATIENT)
Dept: UROLOGY | Facility: CLINIC | Age: 88
End: 2023-07-26
Payer: MEDICARE

## 2023-07-26 VITALS
HEIGHT: 69 IN | RESPIRATION RATE: 18 BRPM | DIASTOLIC BLOOD PRESSURE: 80 MMHG | WEIGHT: 192 LBS | BODY MASS INDEX: 28.44 KG/M2 | OXYGEN SATURATION: 95 % | SYSTOLIC BLOOD PRESSURE: 122 MMHG

## 2023-07-26 DIAGNOSIS — N13.8 BPH WITH OBSTRUCTION/LOWER URINARY TRACT SYMPTOMS: Primary | ICD-10-CM

## 2023-07-26 DIAGNOSIS — N40.1 BPH WITH OBSTRUCTION/LOWER URINARY TRACT SYMPTOMS: Primary | ICD-10-CM

## 2023-07-26 PROCEDURE — 99213 OFFICE O/P EST LOW 20 MIN: CPT | Performed by: PHYSICIAN ASSISTANT

## 2023-07-27 NOTE — PROGRESS NOTES
UROLOGY PROGRESS NOTE   Patient Identifiers: Cherelle Edwards (MRN [id number])  Date of Service: 7/27/2023    Subjective:   80-year-old man with history of BPH and urinary frequency. I just long discussion about his urinary symptoms. He is on tamsulosin. He gets up 1-2 times a night and has occasional urge leak. No UTI no hematuria. Reason for visit: BPH follow-up    Objective:     VITALS:    Vitals:    07/26/23 1046   BP: 122/80   Resp: 18   SpO2: 95%           LABS:  Lab Results   Component Value Date    HGB 12.9 11/15/2019    HCT 38.0 11/15/2019    WBC 5.81 11/15/2019     11/15/2019   ]    Lab Results   Component Value Date    K 4.1 11/15/2019     11/15/2019    CO2 30 11/15/2019    BUN 11 11/15/2019    CREATININE 0.74 11/15/2019    CALCIUM 8.7 11/15/2019   ]        INPATIENT MEDS:    Current Outpatient Medications:   •  amLODIPine (NORVASC) 5 mg tablet, Take 5 mg by mouth every morning., Disp: , Rfl:   •  ascorbic acid (VITAMIN C) 500 mg tablet, Take 500 mg by mouth daily. , Disp: , Rfl:   •  atenolol (TENORMIN) 50 mg tablet, Take 50 mg by mouth every morning., Disp: , Rfl:   •  betamethasone valerate (VALISONE) 0.1 % cream, Apply topically 2 (two) times a day, Disp: 15 g, Rfl: 1  •  calcium-vitamin D (OSCAL) 250-125 MG-UNIT per tablet, Take 1 tablet by mouth daily. , Disp: , Rfl:   •  Cholecalciferol 25 MCG (1000 UT) tablet, Take 1,000 Units by mouth daily, Disp: , Rfl:   •  doxycycline hyclate (VIBRAMYCIN) 100 mg capsule, Take 100 mg by mouth 2 (two) times a day, Disp: , Rfl:   •  fluticasone (FLONASE) 50 mcg/act nasal spray, 1 spray into each nostril daily As needed , Disp: , Rfl:   •  GARLIC PO, Take 1 tablet by mouth daily. , Disp: , Rfl:   •  levothyroxine 75 mcg tablet, Take 75 mcg by mouth daily, Disp: , Rfl:   •  Magnesium 400 MG CAPS, Take 1 tablet by mouth., Disp: , Rfl:   •  Multiple Vitamins-Minerals (CENTRUM ADULTS PO), Take 1 tablet by mouth daily. , Disp: , Rfl:   •  Omega-3 Fatty Acids (OMEGA-3 FISH OIL) 1200 MG CAPS, Take by mouth, Disp: , Rfl:   •  Synthroid 112 MCG tablet, , Disp: , Rfl:   •  tamsulosin (FLOMAX) 0.4 mg, Take 1 capsule (0.4 mg total) by mouth daily with dinner, Disp: 90 capsule, Rfl: 3  •  vitamin E, tocopherol, 400 units capsule, Take 400 Units by mouth daily. , Disp: , Rfl:   •  clotrimazole-betamethasone (LOTRISONE) 1-0.05 % cream, Apply topically 2 (two) times a day for 5 days, Disp: 30 g, Rfl: 1  •  Diclofenac Sodium (VOLTAREN) 1 %, Apply 1 application topically 4 (four) times a day, Disp: , Rfl:       Physical Exam:   /80 (BP Location: Left arm, Patient Position: Sitting, Cuff Size: Adult)   Resp 18   Ht 5' 9" (1.753 m)   Wt 87.1 kg (192 lb)   SpO2 95%   BMI 28.35 kg/m²   GEN: no acute distress    RESP: breathing comfortably with no accessory muscle use    ABD: soft, non-tender, non-distended   INCISION:    EXT: no significant peripheral edema       RADIOLOGY:   Nonenone     Assessment:   #1.   BPH    Plan:   -I again had a long discussion with the patient and I also updated his  daughters  -No intervention recommended  -Follow-up in 1 year for his annual exam as previously scheduled  -

## 2023-08-28 DIAGNOSIS — N13.8 BPH WITH OBSTRUCTION/LOWER URINARY TRACT SYMPTOMS: ICD-10-CM

## 2023-08-28 DIAGNOSIS — N40.1 BPH WITH OBSTRUCTION/LOWER URINARY TRACT SYMPTOMS: ICD-10-CM

## 2023-08-31 DIAGNOSIS — N40.1 BPH WITH OBSTRUCTION/LOWER URINARY TRACT SYMPTOMS: ICD-10-CM

## 2023-08-31 DIAGNOSIS — N13.8 BPH WITH OBSTRUCTION/LOWER URINARY TRACT SYMPTOMS: ICD-10-CM

## 2023-08-31 RX ORDER — TAMSULOSIN HYDROCHLORIDE 0.4 MG/1
0.4 CAPSULE ORAL
Qty: 90 CAPSULE | Refills: 3 | Status: SHIPPED | OUTPATIENT
Start: 2023-08-31

## 2023-08-31 NOTE — TELEPHONE ENCOUNTER
Pt's daughter called requesting med refill on flomax,- to [facility] in [place],  states pt doesn't have any more and needs the rx asap. Saw that medication was requested a few days ago. Explained to pt's daughter La Coker I would take a message and forward to the office. Please call daughter if any questions to [phone]. Thank you.

## 2023-09-06 RX ORDER — TAMSULOSIN HYDROCHLORIDE 0.4 MG/1
0.4 CAPSULE ORAL
Qty: 90 CAPSULE | Refills: 3 | Status: SHIPPED | OUTPATIENT
Start: 2023-09-06

## 2024-01-03 ENCOUNTER — OFFICE VISIT (OUTPATIENT)
Dept: PHYSICAL THERAPY | Facility: CLINIC | Age: 89
End: 2024-01-03
Payer: MEDICARE

## 2024-01-03 DIAGNOSIS — M54.50 CHRONIC BILATERAL LOW BACK PAIN WITHOUT SCIATICA: ICD-10-CM

## 2024-01-03 DIAGNOSIS — M43.06 LUMBAR SPONDYLOLYSIS: ICD-10-CM

## 2024-01-03 DIAGNOSIS — G89.29 CHRONIC BILATERAL LOW BACK PAIN WITHOUT SCIATICA: ICD-10-CM

## 2024-01-03 DIAGNOSIS — M54.50 LUMBAR PAIN: Primary | ICD-10-CM

## 2024-01-03 PROCEDURE — 97110 THERAPEUTIC EXERCISES: CPT

## 2024-01-03 PROCEDURE — 97162 PT EVAL MOD COMPLEX 30 MIN: CPT

## 2024-01-03 NOTE — PROGRESS NOTES
PT Evaluation     Today's date: 1/3/2024  Patient name: Cindy Perez  : 1928  MRN: [id number]  Referring provider: Efrem Keane MD  Dx:   Encounter Diagnosis     ICD-10-CM    1. Lumbar pain  M54.50       2. 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 Chronic bilateral low back pain without sciatica  M54.50     G89.29                      Assessment  Assessment details: Cindy Perez is a 89 y.o. male who presents with signs and symptoms consistent of referring diagnosis based off of subjective/objective findings. Patient presents with pain, decreased strength, decreased ROM, decreased joint mobility, ambulatory dysfunction, and postural dysfunction. Due to these impairments, Patient has difficulty performing recreational activities and engaging in social activities. Of note, patient is most limited with lumbar mobility which has led to impaired activity tolerance and increases in pain. Patient would benefit from a comprehensive HEP focusing on improving said deficits.  Patient was educated on and provided with an at home exercise program this date to be completed. Patient verbalized understanding of the importance of completion. Patient would benefit from skilled physical therapy to address the impairments, improve their level of function, and to improve their overall quality of life. PT POC 2x/wk for 6 weeks. Thank you for this referral.    Impairments: abnormal or restricted ROM, activity intolerance, impaired physical strength, lacks appropriate home exercise program and pain with function    Symptom irritability: moderateUnderstanding of Dx/Px/POC: good   Prognosis: good    Goals  Short Term Goals: to be achieved by 4 weeks  1) Patient to be independent with basic HEP.  2) Decrease pain to 4/10 at its worst.  3) Increase lumbar spine ROM by 50% in all deficient planes.   4) Increase LE strength by 1/2 MMT grade in all deficient planes.    Long Term Goals: to be achieved by discharge  1) FOTO equal to or  greater than expected outcome.  2) Patient to be independent with comprehensive HEP.  3) Lumbar spine ROM WNL all planes to improve a/iadls.  4) Increase LE strength to 5/5 MMT grade in all planes to improve a/iadls.  5) Patient to report no sleep interruption secondary to pain.  6) Increase seated and ambulatory tolerance to 30 min.      Plan  Patient would benefit from: PT eval and skilled physical therapy  Planned therapy interventions: activity modification, abdominal trunk stabilization, joint mobilization, manual therapy, neuromuscular re-education, patient education, strengthening, stretching, therapeutic activities, therapeutic exercise, home exercise program, functional ROM exercises and body mechanics training  Frequency: 2x week  Duration in weeks: 6  Treatment plan discussed with: patient        Subjective Evaluation    History of Present Illness  Mechanism of injury: History of Current Injury: Patient notes back pain that has been present on and off for roughly 2 years. He reports an exacerbation which led him to see his PCP initially in September. This pain is noted in B/L low back which began to worsen in early December. Patient was given a couple of medications which he notes initially did not help but have since improved his sxs although he only uses them when necessary. Patient notes difficulty with some of his daily tasks and would like to pursue PT in order to improve these.   Pain location/Descriptors: Both sides of the low back  Aggravating factors: walking, lying flat, going from sitting to standing  Easing factors: Sitting  24 HR pattern: None although he notes increased stiffness in the back in the mornings   Imaging: nothing recently   Special Questions: Cindy denies a new onset of Bladder incontinence, Bowel dysfunction, Dysphagia, Dysarthria, Tingling, Numbness, and Saddle anesthesia .  Patient goals:  improve pain, motion   Hobbies/Interest:   Occupation: retired      Quality of life:  good    Patient Goals  Patient goals for therapy: decreased pain, return to sport/leisure activities, increased strength and increased motion    Pain  Current pain ratin  At best pain ratin  At worst pain rating: 3  Quality: dull ache          Objective     Palpation   Left   No palpable tenderness to the erector spinae, lumbar paraspinals and quadratus lumborum.     Right   No palpable tenderness to the erector spinae, lumbar paraspinals and quadratus lumborum.     Tenderness     Lumbar Spine  No tenderness in the spinous process, left transverse process and right transverse process.     Left Hip   Tenderness in the PSIS.     Right Hip   No tenderness in the PSIS.     Neurological Testing     Sensation     Lumbar   Left   Intact: light touch    Right   Intact: light touch    Reflexes   Left   Patellar (L4): normal (2+)  Achilles (S1): normal (2+)  Clonus sign: negative    Right   Patellar (L4): normal (2+)  Achilles (S1): normal (2+)  Clonus sign: negative    Strength/Myotome Testing     Left Hip   Planes of Motion   Flexion: 4  Abduction: 5  Adduction: 5  External rotation: 4+  Internal rotation: 4+    Right Hip   Planes of Motion   Flexion: 4  Abduction: 5  Adduction: 5  External rotation: 4+  Internal rotation: 4+    Left Knee   Flexion: 4+  Extension: 4+    Right Knee   Flexion: 4+  Extension: 4+    Left Ankle/Foot   Dorsiflexion: 4+  Plantar flexion: 4+  Great toe extension: 5    Right Ankle/Foot   Dorsiflexion: 4+  Plantar flexion: 4+  Great toe extension: 5    Tests     Lumbar     Left   Negative quadrant.     Right   Negative quadrant.     Left Pelvic Girdle/Sacrum   Positive: active SLR test.     Right Pelvic Girdle/Sacrum   Negative: active SLR test.     Left Hip   Positive DON and FADIR.     Right Hip   Positive DON and FADIR.                Diagnosis: LBP   Precautions:    POC Expires:   Re-evaluation Date:    FOTO Scores/Date:   Visit Count 1       Manuals 1/3       LS STM        LS Mobs         LS Gapping         Hip IR/ER Mobs        Hip extension PROM        Ther Ex        Bike         LTR HEP       BKFO HEP       TrA brace HEP       Posterior pelvic tilts        SLR        SL abduction        Glute bridges        Modified Quadruped rotations         Cat cow        Open Books         Worlds greatest stretch                Patient education        HEP creation        Neuro Re-Ed        TrA bracing        Supine tapdowns        Supine marches                                                                                                Ther Act              step ups               trx squats              Modalities

## 2024-01-03 NOTE — LETTER
January 3, 2024    Efrem Keane MD  [address]  [address]  [place] [address]    Patient: Cindy Perez   YOB: 1928   Date of Visit: 1/3/2024     Encounter Diagnosis     ICD-10-CM    1. Lumbar pain  M54.50       2. 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 Chronic bilateral low back pain without sciatica  M54.50     G89.29           Dear Dr. Keane:    Thank you for your recent referral of Cindy Perez. Please review the attached evaluation summary from Cindy's recent visit.     Please verify that you agree with the plan of care by signing the attached order.     If you have any questions or concerns, please do not hesitate to call.     I sincerely appreciate the opportunity to share in the care of one of your patients and hope to have another opportunity to work with you in the near future.       Sincerely,    Patrice Mccullough, PT      Referring Provider:      I certify that I have read the below Plan of Care and certify the need for these services furnished under this plan of treatment while under my care.                    Efrem Keane MD  [address]  [address]  [place] [address]  Via Fax: [phone]          PT Evaluation     Today's date: 1/3/2024  Patient name: Cindy Perez  : 1928  MRN: [id number]  Referring provider: Efrem Keane MD  Dx:   Encounter Diagnosis     ICD-10-CM    1. Lumbar pain  M54.50       2. 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 Chronic bilateral low back pain without sciatica  M54.50     G89.29                      Assessment  Assessment details: Cindy Perez is a 89 y.o. male who presents with signs and symptoms consistent of referring diagnosis based off of subjective/objective findings. Patient presents with pain, decreased strength, decreased ROM, decreased joint mobility, ambulatory dysfunction, and postural dysfunction. Due to these impairments, Patient has difficulty performing recreational activities and engaging in social activities. Of note,  patient is most limited with lumbar mobility which has led to impaired activity tolerance and increases in pain. Patient would benefit from a comprehensive HEP focusing on improving said deficits.  Patient was educated on and provided with an at home exercise program this date to be completed. Patient verbalized understanding of the importance of completion. Patient would benefit from skilled physical therapy to address the impairments, improve their level of function, and to improve their overall quality of life. PT POC 2x/wk for 6 weeks. Thank you for this referral.    Impairments: abnormal or restricted ROM, activity intolerance, impaired physical strength, lacks appropriate home exercise program and pain with function    Symptom irritability: moderateUnderstanding of Dx/Px/POC: good   Prognosis: good    Goals  Short Term Goals: to be achieved by 4 weeks  1) Patient to be independent with basic HEP.  2) Decrease pain to 4/10 at its worst.  3) Increase lumbar spine ROM by 50% in all deficient planes.   4) Increase LE strength by 1/2 MMT grade in all deficient planes.    Long Term Goals: to be achieved by discharge  1) FOTO equal to or greater than expected outcome.  2) Patient to be independent with comprehensive HEP.  3) Lumbar spine ROM WNL all planes to improve a/iadls.  4) Increase LE strength to 5/5 MMT grade in all planes to improve a/iadls.  5) Patient to report no sleep interruption secondary to pain.  6) Increase seated and ambulatory tolerance to 30 min.      Plan  Patient would benefit from: PT eval and skilled physical therapy  Planned therapy interventions: activity modification, abdominal trunk stabilization, joint mobilization, manual therapy, neuromuscular re-education, patient education, strengthening, stretching, therapeutic activities, therapeutic exercise, home exercise program, functional ROM exercises and body mechanics training  Frequency: 2x week  Duration in weeks: 6  Treatment plan  discussed with: patient        Subjective Evaluation    History of Present Illness  Mechanism of injury: History of Current Injury: Patient notes back pain that has been present on and off for roughly 2 years. He reports an exacerbation which led him to see his PCP initially in September. This pain is noted in B/L low back which began to worsen in early December. Patient was given a couple of medications which he notes initially did not help but have since improved his sxs although he only uses them when necessary. Patient notes difficulty with some of his daily tasks and would like to pursue PT in order to improve these.   Pain location/Descriptors: Both sides of the low back  Aggravating factors: walking, lying flat, going from sitting to standing  Easing factors: Sitting  24 HR pattern: None although he notes increased stiffness in the back in the mornings   Imaging: nothing recently   Special Questions: Jitendraour denies a new onset of Bladder incontinence, Bowel dysfunction, Dysphagia, Dysarthria, Tingling, Numbness, and Saddle anesthesia .  Patient goals:  improve pain, motion   Hobbies/Interest:   Occupation: retired      Quality of life: good    Patient Goals  Patient goals for therapy: decreased pain, return to sport/leisure activities, increased strength and increased motion    Pain  Current pain ratin  At best pain ratin  At worst pain rating: 3  Quality: dull ache          Objective     Palpation   Left   No palpable tenderness to the erector spinae, lumbar paraspinals and quadratus lumborum.     Right   No palpable tenderness to the erector spinae, lumbar paraspinals and quadratus lumborum.     Tenderness     Lumbar Spine  No tenderness in the spinous process, left transverse process and right transverse process.     Left Hip   Tenderness in the PSIS.     Right Hip   No tenderness in the PSIS.     Neurological Testing     Sensation     Lumbar   Left   Intact: light touch    Right   Intact: light  touch    Reflexes   Left   Patellar (L4): normal (2+)  Achilles (S1): normal (2+)  Clonus sign: negative    Right   Patellar (L4): normal (2+)  Achilles (S1): normal (2+)  Clonus sign: negative    Strength/Myotome Testing     Left Hip   Planes of Motion   Flexion: 4  Abduction: 5  Adduction: 5  External rotation: 4+  Internal rotation: 4+    Right Hip   Planes of Motion   Flexion: 4  Abduction: 5  Adduction: 5  External rotation: 4+  Internal rotation: 4+    Left Knee   Flexion: 4+  Extension: 4+    Right Knee   Flexion: 4+  Extension: 4+    Left Ankle/Foot   Dorsiflexion: 4+  Plantar flexion: 4+  Great toe extension: 5    Right Ankle/Foot   Dorsiflexion: 4+  Plantar flexion: 4+  Great toe extension: 5    Tests     Lumbar     Left   Negative quadrant.     Right   Negative quadrant.     Left Pelvic Girdle/Sacrum   Positive: active SLR test.     Right Pelvic Girdle/Sacrum   Negative: active SLR test.     Left Hip   Positive DON and FADIR.     Right Hip   Positive DON and FADIR.                Diagnosis: LBP   Precautions:    POC Expires:   Re-evaluation Date:    FOTO Scores/Date:   Visit Count 1       Manuals 1/3       LS STM        LS Mobs        LS Gapping         Hip IR/ER Mobs        Hip extension PROM        Ther Ex        Bike         LTR HEP       BKFO HEP       TrA brace HEP       Posterior pelvic tilts        SLR        SL abduction        Glute bridges        Modified Quadruped rotations         Cat cow        Open Books         Worlds greatest stretch                Patient education        HEP creation        Neuro Re-Ed        TrA bracing        Supine tapdowns        Supine marches                                                                                                Ther Act              step ups               trx squats              Modalities

## 2024-01-10 ENCOUNTER — OFFICE VISIT (OUTPATIENT)
Dept: PHYSICAL THERAPY | Facility: CLINIC | Age: 89
End: 2024-01-10
Payer: MEDICARE

## 2024-01-10 DIAGNOSIS — G89.29 CHRONIC BILATERAL LOW BACK PAIN WITHOUT SCIATICA: ICD-10-CM

## 2024-01-10 DIAGNOSIS — M54.50 CHRONIC BILATERAL LOW BACK PAIN WITHOUT SCIATICA: ICD-10-CM

## 2024-01-10 DIAGNOSIS — M43.06 LUMBAR SPONDYLOLYSIS: ICD-10-CM

## 2024-01-10 DIAGNOSIS — M54.50 LUMBAR PAIN: Primary | ICD-10-CM

## 2024-01-10 PROCEDURE — 97110 THERAPEUTIC EXERCISES: CPT

## 2024-01-10 PROCEDURE — 97112 NEUROMUSCULAR REEDUCATION: CPT

## 2024-01-10 NOTE — PROGRESS NOTES
Daily Note     Today's date: 1/10/2024  Patient name: Cindy Perez  : 1928  MRN: [id number]  Referring provider: Efrem Keane MD  Dx:   Encounter Diagnosis     ICD-10-CM    1. Lumbar pain  M54.50       2. 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 Chronic bilateral low back pain without sciatica  M54.50     G89.29                      Subjective: Patient notes some improvement in his back pain since his last visit.       Objective: See treatment diary below      Assessment: Tolerated treatment well. Initiated POC this date. Good tolerance to mobility this date with most comfort noted in flexion. Trialed gentle thoracic/lumbar extension to good benefit. Pain noted during SLR this date with patient able to complete 5 repetitions. Continue to load lumbar spine. Updated HEP. Patient exhibited good technique with therapeutic exercises and would benefit from continued PT      Plan: Continue per plan of care.      Diagnosis: LBP   Precautions:    POC Expires:   Re-evaluation Date:    FOTO Scores/Date:   Visit Count 1 2      Manuals 1/3 1/10      LS STM        LS Mobs        LS Gapping         Hip IR/ER Mobs        Hip extension PROM        Ther Ex        Nustep  5'       LTR HEP W/pball 20x       BKFO HEP       TrA brace HEP       Posterior pelvic tilts        SLR  X5 ea       SL abduction        Glute bridges        Modified Quadruped rotations         Cat cow        Open Books         Worlds greatest stretch        DKTC  20x pball       Ball rollouts   15x ea direction       Thoracic extensions   20x       Neuro Re-Ed        TrA bracing        Supine tapdowns        Supine marches   10x ea side       Hip isometrics   30x abd/add                                                                                      Ther Act              step ups               trx squats              Modalities

## 2024-01-11 ENCOUNTER — OFFICE VISIT (OUTPATIENT)
Dept: PHYSICAL THERAPY | Facility: CLINIC | Age: 89
End: 2024-01-11
Payer: MEDICARE

## 2024-01-11 DIAGNOSIS — M54.50 CHRONIC BILATERAL LOW BACK PAIN WITHOUT SCIATICA: ICD-10-CM

## 2024-01-11 DIAGNOSIS — M54.50 LUMBAR PAIN: Primary | ICD-10-CM

## 2024-01-11 DIAGNOSIS — G89.29 CHRONIC BILATERAL LOW BACK PAIN WITHOUT SCIATICA: ICD-10-CM

## 2024-01-11 DIAGNOSIS — M43.06 LUMBAR SPONDYLOLYSIS: ICD-10-CM

## 2024-01-11 PROCEDURE — 97112 NEUROMUSCULAR REEDUCATION: CPT

## 2024-01-11 PROCEDURE — 97110 THERAPEUTIC EXERCISES: CPT

## 2024-01-11 NOTE — PROGRESS NOTES
Daily Note     Today's date: 2024  Patient name: Cindy Perez  : 1928  MRN: [id number]  Referring provider: Efrem Keane MD  Dx:   Encounter Diagnosis     ICD-10-CM    1. Lumbar pain  M54.50       2. 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 Chronic bilateral low back pain without sciatica  M54.50     G89.29                      Subjective: Patient notes no changes since last visit.       Objective: See treatment diary below      Assessment: Tolerated treatment well. Most of session performed in sitting and standing this date. No pain noted throughout session with patient showing good form with minimal cueing required. Fatigue noted by end of session. Progress as able. Patient would benefit from continued PT      Plan: Continue per plan of care.      Diagnosis: LBP   Precautions:    POC Expires:   Re-evaluation Date:    FOTO Scores/Date:   Visit Count 1 2 3     Manuals 1/3 1/10 1/11     LS STM        LS Mobs        LS Gapping         Hip IR/ER Mobs        Hip extension PROM        Ther Ex        Nustep  5'  6'      LTR HEP W/pball 20x       BKFO HEP       TrA brace HEP       Posterior pelvic tilts        SLR  X5 ea       SL abduction        Glute bridges        LAQ   20x ea 2#      Hamstring curls    20x ea              Worlds greatest stretch        DKTC  20x pball       Ball rollouts   15x ea direction  30x w/pball      Thoracic extensions   20x       Neuro Re-Ed        TrA bracing        Supine tapdowns        Supine marches   10x ea side  Standing 30x      Hip isometrics   30x abd/add  30x abd/add      Standing hip abd   2x10 ea      Standing hip ext    2x10 ea                                                                     Ther Act              step ups               trx squats              Modalities                                                   [id number]

## 2024-01-15 ENCOUNTER — OFFICE VISIT (OUTPATIENT)
Dept: PHYSICAL THERAPY | Facility: CLINIC | Age: 89
End: 2024-01-15
Payer: MEDICARE

## 2024-01-15 DIAGNOSIS — G89.29 CHRONIC BILATERAL LOW BACK PAIN WITHOUT SCIATICA: ICD-10-CM

## 2024-01-15 DIAGNOSIS — M54.50 LUMBAR PAIN: Primary | ICD-10-CM

## 2024-01-15 DIAGNOSIS — M54.50 CHRONIC BILATERAL LOW BACK PAIN WITHOUT SCIATICA: ICD-10-CM

## 2024-01-15 DIAGNOSIS — M43.06 LUMBAR SPONDYLOLYSIS: ICD-10-CM

## 2024-01-15 PROCEDURE — 97112 NEUROMUSCULAR REEDUCATION: CPT

## 2024-01-15 PROCEDURE — 97110 THERAPEUTIC EXERCISES: CPT

## 2024-01-17 ENCOUNTER — OFFICE VISIT (OUTPATIENT)
Dept: PHYSICAL THERAPY | Facility: CLINIC | Age: 89
End: 2024-01-17
Payer: MEDICARE

## 2024-01-17 DIAGNOSIS — M54.50 LUMBAR PAIN: Primary | ICD-10-CM

## 2024-01-17 DIAGNOSIS — G89.29 CHRONIC BILATERAL LOW BACK PAIN WITHOUT SCIATICA: ICD-10-CM

## 2024-01-17 DIAGNOSIS — M43.06 LUMBAR SPONDYLOLYSIS: ICD-10-CM

## 2024-01-17 DIAGNOSIS — M54.50 CHRONIC BILATERAL LOW BACK PAIN WITHOUT SCIATICA: ICD-10-CM

## 2024-01-17 PROCEDURE — 97112 NEUROMUSCULAR REEDUCATION: CPT

## 2024-01-17 PROCEDURE — 97110 THERAPEUTIC EXERCISES: CPT

## 2024-01-17 NOTE — PROGRESS NOTES
Daily Note     Today's date: 2024  Patient name: Cindy Perez  : 1928  MRN: [id number]  Referring provider: Efrem Keane MD  Dx:   Encounter Diagnosis     ICD-10-CM    1. Lumbar pain  M54.50       2. 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 Chronic bilateral low back pain without sciatica  M54.50     G89.29                      Subjective: Patient reported some soreness and stiffness following his session on Monday. Was feeling better by Tuesday.       Objective: See treatment diary below      Assessment: Tolerated treatment well. Therapist maintained exercises from last session due to subjective reports. Added rotational mobility and strengthening to good effect. Increased resistance during strengthening exercises. Loaded lumbar spine to good effect. Cueing provided to maintain posture during exercise. Progress as able. Patient would benefit from continued PT      Plan: Continue per plan of care.      Diagnosis: LBP   Precautions:    POC Expires:   Re-evaluation Date:    FOTO Scores/Date:   Visit Count 1 2 3 4 5   Manuals 1/3 1/10 1/11 1/15 1/17   LS STM        LS Mobs        LS Gapping         Hip IR/ER Mobs        Hip extension PROM        Ther Ex        Nustep  5'  6'  6' L3  6' L3    LTR HEP W/pball 20x       BKFO HEP       TrA brace HEP       Posterior pelvic tilts        SLR  X5 ea       SL abduction        Glute bridges        LAQ   20x ea 2#      Hamstring curls    20x ea  20x 2# ea  2x10 2# ea    Trunk twists      15x ea side    Worlds greatest stretch        DKTC  20x pball       Ball rollouts   15x ea direction  30x w/pball  30x w pball  20x w/pball all directions    Thoracic extensions   20x   20x  20x    Neuro Re-Ed        TrA bracing        Supine tapdowns        Supine marches   10x ea side  Standing 30x  Standing 2# 20x  Standing 15x 2#    Hip isometrics   30x abd/add  30x abd/add      Standing hip abd   2x10 ea  2x10 2# 15x 2#    Standing hip ext    2x10 ea  2x10 2# 15x 2#    Paloff  press     Seated @ mirror 20x rtb ea side  20x ea seated @ mirror gtb    Trunk twists      20x ea gtb seated                                                   Ther Act              step ups               trx squats              Modalities                                                   [id number]

## 2024-01-22 ENCOUNTER — OFFICE VISIT (OUTPATIENT)
Dept: PHYSICAL THERAPY | Facility: CLINIC | Age: 89
End: 2024-01-22
Payer: MEDICARE

## 2024-01-22 DIAGNOSIS — M54.50 LUMBAR PAIN: Primary | ICD-10-CM

## 2024-01-22 DIAGNOSIS — M43.06 LUMBAR SPONDYLOLYSIS: ICD-10-CM

## 2024-01-22 DIAGNOSIS — M54.50 CHRONIC BILATERAL LOW BACK PAIN WITHOUT SCIATICA: ICD-10-CM

## 2024-01-22 DIAGNOSIS — G89.29 CHRONIC BILATERAL LOW BACK PAIN WITHOUT SCIATICA: ICD-10-CM

## 2024-01-22 PROCEDURE — 97110 THERAPEUTIC EXERCISES: CPT

## 2024-01-22 PROCEDURE — 97112 NEUROMUSCULAR REEDUCATION: CPT

## 2024-01-22 NOTE — PROGRESS NOTES
Daily Note     Today's date: 2024  Patient name: Cindy Perez  : 1928  MRN: [id number]  Referring provider: Efrem Keane MD  Dx:   Encounter Diagnosis     ICD-10-CM    1. Lumbar pain  M54.50       2. 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 Chronic bilateral low back pain without sciatica  M54.50     G89.29             Start Time: 1220  Stop Time: 1300  Total time in clinic (min): 40 minutes    Subjective: Patient reports no new complaints or major changes sin ce last session. Patient notes improvements overall with lumbar symptoms and balance/stability since starting PT.      Objective: See treatment diary below      Assessment: Tolerated treatment well. Patient did not experience any onset of lumbar pain throughout any exercises performed during today's session. Moderate verbal and visual cueing required for proper form and recall of exercises with fair carryover noted. Continue to progress lumbar/core strengthening and stabilization exercises as able. Muscle fatigue present at the conclusion of session. Patient would benefit from continued PT      Plan: Continue per plan of care.      Diagnosis: LBP   Precautions:    POC Expires:   Re-evaluation Date:    FOTO Scores/Date:   Visit Count 6 2 3 4 5   Manuals 1/22 1/10 1/11 1/15 1/17   LS STM        LS Mobs        LS Gapping         Hip IR/ER Mobs        Hip extension PROM        Ther Ex        Nustep 6' L3 5'  6'  6' L3  6' L3    LTR  W/pball 20x       BKFO        TrA brace        Posterior pelvic tilts        SLR  X5 ea       SL abduction        Glute bridges        LAQ   20x ea 2#      Hamstring curls  2x10 3# ea  20x ea  20x 2# ea  2x10 2# ea    Trunk twists  15x ea side    15x ea side    Worlds greatest stretch        DKTC  20x pball       Ball rollouts  20x w/pball all directions 15x ea direction  30x w/pball  30x w pball  20x w/pball all directions    Thoracic extensions  20x 20x   20x  20x    Neuro Re-Ed        TrA bracing        Supine tapdowns         Supine marches  Standing 15x 2# 10x ea side  Standing 30x  Standing 2# 20x  Standing 15x 2#    Hip isometrics   30x abd/add  30x abd/add      Standing hip abd 2x10 3#  2x10 ea  2x10 2# 15x 2#    Standing hip ext  2x10 3#  2x10 ea  2x10 2# 15x 2#    Paloff press  20x ea gtb seated    Seated @ mirror 20x rtb ea side  20x ea seated @ mirror gtb    Trunk twists  20x ea gtb seated     20x ea gtb seated                                                   Ther Act              step ups               trx squats              Modalities

## 2024-01-24 ENCOUNTER — OFFICE VISIT (OUTPATIENT)
Dept: PHYSICAL THERAPY | Facility: CLINIC | Age: 89
End: 2024-01-24
Payer: MEDICARE

## 2024-01-24 DIAGNOSIS — M54.50 CHRONIC BILATERAL LOW BACK PAIN WITHOUT SCIATICA: ICD-10-CM

## 2024-01-24 DIAGNOSIS — G89.29 CHRONIC BILATERAL LOW BACK PAIN WITHOUT SCIATICA: ICD-10-CM

## 2024-01-24 DIAGNOSIS — M54.50 LUMBAR PAIN: Primary | ICD-10-CM

## 2024-01-24 DIAGNOSIS — M43.06 LUMBAR SPONDYLOLYSIS: ICD-10-CM

## 2024-01-24 PROCEDURE — 97112 NEUROMUSCULAR REEDUCATION: CPT

## 2024-01-24 PROCEDURE — 97110 THERAPEUTIC EXERCISES: CPT

## 2024-01-24 NOTE — PROGRESS NOTES
Daily Note     Today's date: 2024  Patient name: Cindy Perez  : 1928  MRN: [id number]  Referring provider: Efrem Keane MD  Dx:   Encounter Diagnosis     ICD-10-CM    1. Lumbar pain  M54.50       2. 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 Chronic bilateral low back pain without sciatica  M54.50     G89.29                      Subjective: Patient notes some soreness following his last session. Finds that his shoulder has been keeping him from sleeping comfortably at night.       Objective: See treatment diary below      Assessment: Tolerated treatment well. Decreased resistance at patients request this date. Able to perform all standing and seated exercises to good tolerance. Progressed functional standing exercise. Cueing provided to maintain upright posture during exercise. Progress as able. Patient would benefit from continued PT      Plan: Continue per plan of care.      Diagnosis: LBP   Precautions:    POC Expires:   Re-evaluation Date:    FOTO Scores/Date:   Visit Count 6 7/10 3 4 5   Manuals 1/22 1/24 1/11 1/15 1/17   LS STM        LS Mobs        LS Gapping         Hip IR/ER Mobs        Hip extension PROM        Ther Ex        Nustep 6' L3 7' L4  6'  6' L3  6' L3    LTR        BKFO        TrA brace        Posterior pelvic tilts        SLR        SL abduction        Glute bridges        LAQ   20x ea 2#      Hamstring curls  2x10 3# ea 2x10 2 20x ea  20x 2# ea  2x10 2# ea    Trunk twists  15x ea side 15x ea side    15x ea side    Worlds greatest stretch        DKTC        Ball rollouts  20x w/pball all directions 10x all directions  30x w/pball  30x w pball  20x w/pball all directions    Thoracic extensions  20x 10x   20x  20x    Neuro Re-Ed        TrA bracing        Supine tapdowns        Supine marches  Standing 15x 2#  Standing 30x  Standing 2# 20x  Standing 15x 2#    Hip isometrics    30x abd/add      Standing hip abd 2x10 3# 2x10 2#  2x10 ea  2x10 2# 15x 2#    Standing hip ext  2x10 3# 2x10  2#  2x10 ea  2x10 2# 15x 2#    Paloff press  20x ea gtb seated    Seated @ mirror 20x rtb ea side  20x ea seated @ mirror gtb    Trunk twists  20x ea gtb seated  20x gtb seated    20x ea gtb seated    Side steps   3 laps 2# ea                                             Ther Act             step ups              trx squats             Modalities

## 2024-01-31 ENCOUNTER — OFFICE VISIT (OUTPATIENT)
Dept: PHYSICAL THERAPY | Facility: CLINIC | Age: 89
End: 2024-01-31
Payer: MEDICARE

## 2024-01-31 DIAGNOSIS — M54.50 LUMBAR PAIN: Primary | ICD-10-CM

## 2024-01-31 DIAGNOSIS — M54.50 CHRONIC BILATERAL LOW BACK PAIN WITHOUT SCIATICA: ICD-10-CM

## 2024-01-31 DIAGNOSIS — M43.06 LUMBAR SPONDYLOLYSIS: ICD-10-CM

## 2024-01-31 DIAGNOSIS — G89.29 CHRONIC BILATERAL LOW BACK PAIN WITHOUT SCIATICA: ICD-10-CM

## 2024-01-31 PROCEDURE — 97530 THERAPEUTIC ACTIVITIES: CPT

## 2024-01-31 PROCEDURE — 97112 NEUROMUSCULAR REEDUCATION: CPT

## 2024-01-31 PROCEDURE — 97110 THERAPEUTIC EXERCISES: CPT

## 2024-01-31 NOTE — PROGRESS NOTES
Daily Note     Today's date: 2024  Patient name: Cindy Perez  : 1928  MRN: [id number]  Referring provider: Efrem Keane MD  Dx:   Encounter Diagnosis     ICD-10-CM    1. Lumbar pain  M54.50       2. 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 Chronic bilateral low back pain without sciatica  M54.50     G89.29           Start Time: 1131  Stop Time: 1224  Total time in clinic (min): 53 minutes    Subjective: Patient notes some mild improvement in his ability to lay flat. Has been able to lay on his back for 30 mins before having to move. Continues to note some shoulder pain when sleeping as well as discomfort (in the elbows) when using the RW or rollator.       Objective: See treatment diary below      Assessment: Tolerated treatment well. Re-implemented increases in resistance this date. Good tolerance to seated and standing exercise. Cueing given throughout session to focus on lumbar extension/neutral. Practiced and educated patient on proper RW height and usage due to patient subjective reports. Height of walker adjusted to appropriate height with walking trial in which patient did not experience any pain. Progress as able. Patient demonstrated fatigue post treatment, exhibited good technique with therapeutic exercises, and would benefit from continued PT      Plan: Continue per plan of care.      Diagnosis: LBP   Precautions:    POC Expires:   Re-evaluation Date:    FOTO Scores/Date:   Visit Count 6 710 8/10 4 5   Manuals 1/22 1/24 1/31 1/15 1/17   LS STM        LS Mobs        LS Gapping         Hip IR/ER Mobs        Hip extension PROM        Ther Ex        Nustep 6' L3 7' L4  6' L5 6' L3  6' L3    LTR        BKFO        TrA brace        Posterior pelvic tilts        SLR        SL abduction        Glute bridges        LAQ        Hamstring curls  2x10 3# ea 2x10 2 2x10 3# 20x 2# ea  2x10 2# ea    Trunk twists  15x ea side 15x ea side  10x ea   15x ea side    Worlds greatest stretch        DKTC         Ball rollouts  20x w/pball all directions 10x all directions  20x all directions 30x w pball  20x w/pball all directions    Thoracic extensions  20x 10x  20x  20x  20x    Neuro Re-Ed        TrA bracing        Supine tapdowns        Supine marches  Standing 15x 2#  Standing 2x10 2#  Standing 2# 20x  Standing 15x 2#    Hip isometrics         Standing hip abd 2x10 3# 2x10 2#  2x10 3# 2x10 2# 15x 2#    Standing hip ext  2x10 3# 2x10 2#  2x10 3#  2x10 2# 15x 2#    Paloff press  20x ea gtb seated   15x gtb seated  Seated @ mirror 20x rtb ea side  20x ea seated @ mirror gtb    Trunk twists  20x ea gtb seated  20x gtb seated  15x gtb seated   20x ea gtb seated    Side steps   3 laps 2# ea  3 laps 3#                                           Ther Act           Education    On RW, proper use and then practice       step ups             trx squats            Modalities                                              1:1 with PT from [phone]

## 2024-02-02 ENCOUNTER — OFFICE VISIT (OUTPATIENT)
Dept: PHYSICAL THERAPY | Facility: CLINIC | Age: 89
End: 2024-02-02
Payer: MEDICARE

## 2024-02-02 DIAGNOSIS — M43.06 LUMBAR SPONDYLOLYSIS: ICD-10-CM

## 2024-02-02 DIAGNOSIS — M54.50 CHRONIC BILATERAL LOW BACK PAIN WITHOUT SCIATICA: ICD-10-CM

## 2024-02-02 DIAGNOSIS — G89.29 CHRONIC BILATERAL LOW BACK PAIN WITHOUT SCIATICA: ICD-10-CM

## 2024-02-02 DIAGNOSIS — M54.50 LUMBAR PAIN: Primary | ICD-10-CM

## 2024-02-02 PROCEDURE — 97112 NEUROMUSCULAR REEDUCATION: CPT

## 2024-02-02 NOTE — PROGRESS NOTES
Daily Note     Today's date: 2024  Patient name: Cindy Perez  : 1928  MRN: [id number]  Referring provider: Efrem Keane MD  Dx:   Encounter Diagnosis     ICD-10-CM    1. Lumbar pain  M54.50       2. 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 Chronic bilateral low back pain without sciatica  M54.50     G89.29             Start Time: 1146  Stop Time: 1235  Total time in clinic (min): 49 minutes    Subjective: Patient continues to note ongoing L shoulder pain when sleeping and wakes up with moderate stiffness from always laying on that side. Patient otherwise notes no new complaints or major changes since last session.    Objective: See treatment diary below      Assessment: Tolerated treatment well. Progressed resistance and reps throughout session which was fatiguing for patient, but did not result in any adverse response or onset of symptoms throughout. Patient required both verbal and visual cueing with proper form and muscle activation of lumbar rotation and extension in order to prevent any compensations from occurring, with fair carryover noted. Patient displayed good tolerance to standing and seated exercises and only required a few seated rest breaks from increased fatigue. Continue to progress patient as able. Patient demonstrated fatigue post treatment, exhibited good technique with therapeutic exercises, and would benefit from continued PT      Plan: Continue per plan of care.      Diagnosis: LBP   Precautions:    POC Expires:   Re-evaluation Date:    FOTO Scores/Date:   Visit Count 6 710 8/10 9/10 5   Manuals    LS STM        LS Mobs        LS Gapping         Hip IR/ER Mobs        Hip extension PROM        Ther Ex        Nustep 6' L3 7' L4  6' L5 6' L5 6' L3    LTR        BKFO        TrA brace        Posterior pelvic tilts        SLR        SL abduction        Glute bridges        LAQ        Hamstring curls  2x10 3# ea 2x10 2 2x10 3# 2x10 3# 2x10 2# ea    Trunk twists   15x ea side 15x ea side  10x ea  10x ea 15x ea side    Worlds greatest stretch        DKTC        Ball rollouts  20x w/pball all directions 10x all directions  20x all directions 20x all directions 20x w/pball all directions    Thoracic extensions  20x 10x  20x  20x 20x    Neuro Re-Ed        TrA bracing        Supine tapdowns        Supine marches  Standing 15x 2#  Standing 2x10 2#  Standing 2x10 3# Standing 15x 2#    Hip isometrics         Standing hip abd 2x10 3# 2x10 2#  2x10 3# 2x10 3# 15x 2#    Standing hip ext  2x10 3# 2x10 2#  2x10 3#  2x10 3# 15x 2#    Paloff press  20x ea gtb seated   15x gtb seated  20x gtb seated 20x ea seated @ mirror gtb    Trunk twists  20x ea gtb seated  20x gtb seated  15x gtb seated  20x gtb seated 20x ea gtb seated    Side steps   3 laps 2# ea  3 laps 3#  3 laps 3#                                         Ther Act           Education    On RW, proper use and then practice       step ups             trx squats            Modalities

## 2024-02-05 ENCOUNTER — OFFICE VISIT (OUTPATIENT)
Dept: PHYSICAL THERAPY | Facility: CLINIC | Age: 89
End: 2024-02-05
Payer: MEDICARE

## 2024-02-05 DIAGNOSIS — M54.50 CHRONIC BILATERAL LOW BACK PAIN WITHOUT SCIATICA: ICD-10-CM

## 2024-02-05 DIAGNOSIS — G89.29 CHRONIC BILATERAL LOW BACK PAIN WITHOUT SCIATICA: ICD-10-CM

## 2024-02-05 DIAGNOSIS — M43.06 LUMBAR SPONDYLOLYSIS: ICD-10-CM

## 2024-02-05 DIAGNOSIS — M54.50 LUMBAR PAIN: Primary | ICD-10-CM

## 2024-02-05 PROCEDURE — 97112 NEUROMUSCULAR REEDUCATION: CPT

## 2024-02-05 PROCEDURE — 97110 THERAPEUTIC EXERCISES: CPT

## 2024-02-05 NOTE — PROGRESS NOTES
Daily Note     Today's date: 2024  Patient name: Cindy Perez  : 1928  MRN: [id number]  Referring provider: Efrem Keane MD  Dx:   Encounter Diagnosis     ICD-10-CM    1. Lumbar pain  M54.50       2. Chronic bilateral low back pain without sciatica  M54.50     G89.29       3. Lumbar spondylolysis  M43.06                      Subjective: Pt reports he is doing well. He is a little tired today.       Objective: See treatment diary below      Assessment: Tolerated treatment well. Pt performed all exercises without issue, did require regular breaks between exercises. Pt would benefit from continued focus on strength and endurance exercises. Patient demonstrated fatigue post treatment, exhibited good technique with therapeutic exercises, and would benefit from continued PT      Plan: Continue per plan of care.      Diagnosis: LBP   Precautions:    POC Expires:   Re-evaluation Date:    FOTO Scores/Date:   Visit Count 6 7/10 8/10 910 5   Manuals    LS STM        LS Mobs        LS Gapping         Hip IR/ER Mobs        Hip extension PROM        Ther Ex        Nustep 6' L3 7' L4  6' L5 6' L5 8 L5   LTR        BKFO        TrA brace        Posterior pelvic tilts        SLR        SL abduction        Glute bridges        LAQ        Hamstring curls  2x10 3# ea 2x10 2 2x10 3# 2x10 3# 2x10 3#   Trunk twists  15x ea side 15x ea side  10x ea  10x ea 10x ea   Worlds greatest stretch        DKTC        Ball rollouts  20x w/pball all directions 10x all directions  20x all directions 20x all directions 20x all directions    Thoracic extensions  20x 10x  20x  20x 20x   Neuro Re-Ed        TrA bracing        Supine tapdowns        Supine marches  Standing 15x 2#  Standing 2x10 2#  Standing 2x10 3# Standing 2x10 3#   Hip isometrics         Standing hip abd 2x10 3# 2x10 2#  2x10 3# 2x10 3# 2x10 3#   Standing hip ext  2x10 3# 2x10 2#  2x10 3#  2x10 3# 2x10 3#   Paloff press  20x ea gtb seated   15x gtb  seated  20x gtb seated 20x gtb seated   Trunk twists  20x ea gtb seated  20x gtb seated  15x gtb seated  20x gtb seated 20x gtb seated   Side steps   3 laps 2# ea  3 laps 3#  3 laps 3# 3 laps 3#                                       Ther Act          Education    On RW, proper use and then practice       step ups            trx squats            Modalities

## 2024-02-07 ENCOUNTER — OFFICE VISIT (OUTPATIENT)
Dept: PHYSICAL THERAPY | Facility: CLINIC | Age: 89
End: 2024-02-07
Payer: MEDICARE

## 2024-02-07 DIAGNOSIS — M43.06 LUMBAR SPONDYLOLYSIS: ICD-10-CM

## 2024-02-07 DIAGNOSIS — M54.50 LUMBAR PAIN: Primary | ICD-10-CM

## 2024-02-07 DIAGNOSIS — G89.29 CHRONIC BILATERAL LOW BACK PAIN WITHOUT SCIATICA: ICD-10-CM

## 2024-02-07 DIAGNOSIS — M54.50 CHRONIC BILATERAL LOW BACK PAIN WITHOUT SCIATICA: ICD-10-CM

## 2024-02-07 PROCEDURE — 97110 THERAPEUTIC EXERCISES: CPT | Performed by: PHYSICAL THERAPIST

## 2024-02-07 PROCEDURE — 97112 NEUROMUSCULAR REEDUCATION: CPT | Performed by: PHYSICAL THERAPIST

## 2024-02-07 NOTE — PROGRESS NOTES
Daily Note     Today's date: 2024  Patient name: Cindy Perez  : 1928  MRN: [id number]  Referring provider: Efrem Keane MD  Dx:   Encounter Diagnosis     ICD-10-CM    1. Lumbar pain  M54.50       2. Chronic bilateral low back pain without sciatica  M54.50     G89.29       3. Lumbar spondylolysis  M43.06           Start Time: 1220  Stop Time: 1307  Total time in clinic (min): 47 minutes    Subjective: Pt with no new concerns noted at this time. Reports some soreness in the low back, however, no significant pain.       Objective: See treatment diary below      Assessment: Tolerated treatment well. Continued with current POC with good tolerance from the patient. No pain reported with exercises. Occasional cuing required for proper exercise technique. The patient's RW was adjusted to normalize wheel alignment and height with the patient demonstrating improved gait with proper RW height. Continue to progress as tolerated. Patient would benefit from continued PT      Plan: Continue per plan of care.  Progress note during next visit.      Diagnosis: LBP   Precautions:    POC Expires:   Re-evaluation Date:    FOTO Scores/Date:   Visit Count 6 7/10 8/10 9/10 10/10   Manuals  2   LS STM        LS Mobs        LS Gapping         Hip IR/ER Mobs        Hip extension PROM        Ther Ex        Nustep 6' L3 7' L4  6' L5 6' L5 L5 8'    LTR        BKFO        TrA brace        Posterior pelvic tilts        SLR        SL abduction        Glute bridges        LAQ        Hamstring curls  2x10 3# ea 2x10 2 2x10 3# 2x10 3# 2x10 3#   Trunk twists  15x ea side 15x ea side  10x ea  10x ea 10x ea    Worlds greatest stretch        DKTC        Ball rollouts  20x w/pball all directions 10x all directions  20x all directions 20x all directions 20x all directions    Thoracic extensions  20x 10x  20x  20x 20x    Neuro Re-Ed        TrA bracing        Supine tapdowns        Supine marches  Standing 15x 2#  Standing  2x10 2#  Standing 2x10 3# Standing 2x10 3#   Hip isometrics         Standing hip abd 2x10 3# 2x10 2#  2x10 3# 2x10 3# 2x10 3#    Standing hip ext  2x10 3# 2x10 2#  2x10 3#  2x10 3# 2x10 3#   Paloff press  20x ea gtb seated   15x gtb seated  20x gtb seated 20x GTB seated   Trunk twists  20x ea gtb seated  20x gtb seated  15x gtb seated  20x gtb seated 20x GTB seated   Side steps   3 laps 2# ea  3 laps 3#  3 laps 3# 3 laps 3#                                       Ther Act          Education    On RW, proper use and then practice   RW adjustment and practice      step ups            trx squats            Modalities

## 2024-02-12 ENCOUNTER — EVALUATION (OUTPATIENT)
Dept: PHYSICAL THERAPY | Facility: CLINIC | Age: 89
End: 2024-02-12
Payer: MEDICARE

## 2024-02-12 DIAGNOSIS — G89.29 CHRONIC BILATERAL LOW BACK PAIN WITHOUT SCIATICA: ICD-10-CM

## 2024-02-12 DIAGNOSIS — M43.06 LUMBAR SPONDYLOLYSIS: ICD-10-CM

## 2024-02-12 DIAGNOSIS — M54.50 CHRONIC BILATERAL LOW BACK PAIN WITHOUT SCIATICA: ICD-10-CM

## 2024-02-12 DIAGNOSIS — M54.50 LUMBAR PAIN: Primary | ICD-10-CM

## 2024-02-12 PROCEDURE — 97110 THERAPEUTIC EXERCISES: CPT

## 2024-02-12 PROCEDURE — 97140 MANUAL THERAPY 1/> REGIONS: CPT

## 2024-02-12 NOTE — PROGRESS NOTES
PT Re-Evaluation     Today's date: 2024  Patient name: Cindy Perez  : 1928  MRN: [id number]  Referring provider: Efrem Keane MD  Dx:   Encounter Diagnosis     ICD-10-CM    1. Lumbar pain  M54.50       2. Chronic bilateral low back pain without sciatica  M54.50     G89.29       3. 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 male who presents with signs and symptoms consistent of referring diagnosis based off of subjective/objective findings. Patient has made good progress since initiating OPPT. Patient has made improvements in LE strength, lumbar ROM (pain free improvements noted with extension), and functional mobility with decreases in pain which has led to increased activity tolerance. Patient FOTO improved from 45 to 67 in 12 visits since IE, indicating this improvement in function. Patient does however continue to present with impaired lumbar mobility and strength which impact her quality of life. Due to these impairments, Patient continues to have difficulty performing a/iadls, recreational activities and engaging in social activities. Patient would continue to benefit from a comprehensive HEP focusing on improving said deficits. Patient would benefit from skilled physical therapy to address the impairments, improve their level of function, and to improve their overall quality of life. PT POC 2x/wk for 6 weeks. Thank you for this referral.      From IE: Cindy Perez is a 89 y.o. male who presents with signs and symptoms consistent of referring diagnosis based off of subjective/objective findings. Patient presents with pain, decreased strength, decreased ROM, decreased joint mobility, ambulatory dysfunction, and postural dysfunction. Due to these impairments, Patient has difficulty performing recreational activities and engaging in social activities. Of note, patient is most limited with lumbar mobility  which has led to impaired activity tolerance and increases in pain. Patient would benefit from a comprehensive HEP focusing on improving said deficits.  Patient was educated on and provided with an at home exercise program this date to be completed. Patient verbalized understanding of the importance of completion. Patient would benefit from skilled physical therapy to address the impairments, improve their level of function, and to improve their overall quality of life. PT POC 2x/wk for 6 weeks. Thank you for this referral.    Impairments: abnormal or restricted ROM, activity intolerance, impaired physical strength, lacks appropriate home exercise program and pain with function    Symptom irritability: lowUnderstanding of Dx/Px/POC: good   Prognosis: good    Goals  Short Term Goals: to be achieved by 4 weeks  1) Patient to be independent with basic HEP.- met  2) Decrease pain to 4/10 at its worst.- met  3) Increase lumbar spine ROM by 50% in all deficient planes.- met   4) Increase LE strength by 1/2 MMT grade in all deficient planes.-met    Long Term Goals: to be achieved by discharge  1) FOTO equal to or greater than expected outcome.- met  2) Patient to be independent with comprehensive HEP.- progressing  3) Lumbar spine ROM WNL all planes to improve a/iadls.- progressing   4) Increase LE strength to 5/5 MMT grade in all planes to improve a/iadls.- progressing  5) Patient to report no sleep interruption secondary to pain.- progressing  6) Increase seated and ambulatory tolerance to 30 min.- met       Plan  Patient would benefit from: skilled physical therapy  Planned therapy interventions: activity modification, abdominal trunk stabilization, joint mobilization, manual therapy, neuromuscular re-education, patient education, strengthening, stretching, therapeutic activities, therapeutic exercise, home exercise program, functional ROM exercises and body mechanics training  Frequency: 2x week  Duration in weeks:  6  Treatment plan discussed with: patient        Subjective Evaluation    History of Present Illness  Mechanism of injury: History of Current Injury: Patient notes back pain that has been present on and off for roughly 2 years. He reports an exacerbation which led him to see his PCP initially in September. This pain is noted in B/L low back which began to worsen in early December. Patient was given a couple of medications which he notes initially did not help but have since improved his sxs although he only uses them when necessary. Patient notes difficulty with some of his daily tasks and would like to pursue PT in order to improve these.   Pain location/Descriptors: Both sides of the low back  Aggravating factors: walking, lying flat, going from sitting to standing  Easing factors: Sitting  24 HR pattern: None although he notes increased stiffness in the back in the mornings   Imaging: nothing recently   Special Questions: Cindy denies a new onset of Bladder incontinence, Bowel dysfunction, Dysphagia, Dysarthria, Tingling, Numbness, and Saddle anesthesia .  Patient goals:  improve pain, motion   Hobbies/Interest:   Occupation: retired      Quality of life: good    Patient Goals  Patient goals for therapy: decreased pain, return to sport/leisure activities, increased strength and increased motion    Pain  Current pain ratin  At best pain ratin  At worst pain ratin      Cindy reports a perceived improvement of 50%. Patient notes pain has reduced to 0. Patient continues to note difficulty maneuvering in and out of the chair. Patient has returned to lying on his back for roughly 20 mins without issue. He notes improvements in pain levels overall and overall mobility in his daily activities such as bed negotiation and walking. Patient would like to continue to improve some of his LE and lumbar strength.  Overall, patient has made steady progress toward goals and would benefit from additional PT at this  time.       Objective     Palpation   Left   No palpable tenderness to the erector spinae, lumbar paraspinals and quadratus lumborum.     Right   No palpable tenderness to the erector spinae, lumbar paraspinals and quadratus lumborum.     Tenderness     Lumbar Spine  No tenderness in the spinous process, left transverse process and right transverse process.     Left Hip   No tenderness in the PSIS.     Right Hip   No tenderness in the PSIS.     Neurological Testing     Sensation     Lumbar   Left   Intact: light touch    Right   Intact: light touch    Reflexes   Left   Patellar (L4): normal (2+)  Achilles (S1): normal (2+)  Clonus sign: negative    Right   Patellar (L4): normal (2+)  Achilles (S1): normal (2+)  Clonus sign: negative    Strength/Myotome Testing     Left Hip   Planes of Motion   Flexion: 4+  Abduction: 5  Adduction: 5  External rotation: 4+  Internal rotation: 4+    Right Hip   Planes of Motion   Flexion: 4+  Abduction: 5  Adduction: 5  External rotation: 4+  Internal rotation: 4+    Left Knee   Flexion: 4+  Extension: 5    Right Knee   Flexion: 4+  Extension: 4+    Left Ankle/Foot   Dorsiflexion: 5  Plantar flexion: 4+  Great toe extension: 5    Right Ankle/Foot   Dorsiflexion: 5  Plantar flexion: 5  Great toe extension: 5    Tests     Lumbar     Left   Negative quadrant.     Right   Negative quadrant.     Left Pelvic Girdle/Sacrum   Negative: active SLR test.     Right Pelvic Girdle/Sacrum   Negative: active SLR test.     Left Hip   Positive DON.   Negative FADIR.     Right Hip   Positive DON.   Negative FADIR.                Diagnosis: LBP   Precautions:    POC Expires:   Re-evaluation Date:    FOTO Scores/Date:   Visit Count 1/10 7/10 8/10 9/10 10/10   Manuals 2/12 1/24 1/31 2/2 2/5   Re-eval  EB                                       Ther Ex        Nustep  7' L4  6' L5 6' L5 L5 8'    LTR        BKFO        TrA brace        Posterior pelvic tilts        SLR        SL abduction        Glute  bridges        LAQ        Hamstring curls   2x10 2 2x10 3# 2x10 3# 2x10 3#   Trunk twists  15x ea side  15x ea side  10x ea  10x ea 10x ea    Worlds greatest stretch        DKTC        Ball rollouts  10x  10x all directions  20x all directions 20x all directions 20x all directions    Thoracic extensions  30x then standing 20x  10x  20x  20x 20x    Neuro Re-Ed        TrA bracing        Supine tapdowns        Supine marches    Standing 2x10 2#  Standing 2x10 3# Standing 2x10 3#   Hip isometrics         Standing hip abd  2x10 2#  2x10 3# 2x10 3# 2x10 3#    Standing hip ext   2x10 2#  2x10 3#  2x10 3# 2x10 3#   Paloff press    15x gtb seated  20x gtb seated 20x GTB seated   Trunk twists   20x gtb seated  15x gtb seated  20x gtb seated 20x GTB seated   Side steps   3 laps 2# ea  3 laps 3#  3 laps 3# 3 laps 3#                                      Ther Act         Education    On RW, proper use and then practice   RW adjustment and practice      step ups           trx squats           Modalities                                            Patient re-evaluated x30 mins

## 2024-02-12 NOTE — LETTER
2024    Efrem Keane MD  [address]  [address]  [facility] [address]    Patient: Cindy Perez   YOB: 1928   Date of Visit: 2024     Encounter Diagnosis     ICD-10-CM    1. Lumbar pain  M54.50       2. Chronic bilateral low back pain without sciatica  M54.50     G89.29       3. 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 Keane:    Thank you for your recent referral of Cindy Perez. Please review the attached evaluation summary from Cindy's recent visit.     Please verify that you agree with the plan of care by signing the attached order.     If you have any questions or concerns, please do not hesitate to call.     I sincerely appreciate the opportunity to share in the care of one of your patients and hope to have another opportunity to work with you in the near future.       Sincerely,    Patrice Mccullough, PT      Referring Provider:      I certify that I have read the below Plan of Care and certify the need for these services furnished under this plan of treatment while under my care.                    Efrem Keane MD  [address]  [address]  [facility] [address]  Via Fax: [phone]          PT Re-Evaluation     Today's date: 2024  Patient name: Cindy Perez  : 1928  MRN: [id number]  Referring provider: Efrem Keane MD  Dx:   Encounter Diagnosis     ICD-10-CM    1. Lumbar pain  M54.50       2. Chronic bilateral low back pain without sciatica  M54.50     G89.29       3. 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 male who presents with signs and symptoms consistent of referring diagnosis based off of subjective/objective findings. Patient has made good progress since initiating OPPT. Patient has made improvements in LE strength, lumbar ROM (pain free improvements noted with extension), and functional mobility with decreases in pain which has led to increased activity  tolerance. Patient FOTO improved from 45 to 67 in 12 visits since IE, indicating this improvement in function. Patient does however continue to present with impaired lumbar mobility and strength which impact her quality of life. Due to these impairments, Patient continues to have difficulty performing a/iadls, recreational activities and engaging in social activities. Patient would continue to benefit from a comprehensive HEP focusing on improving said deficits. Patient would benefit from skilled physical therapy to address the impairments, improve their level of function, and to improve their overall quality of life. PT POC 2x/wk for 6 weeks. Thank you for this referral.      From IE: Cindy Perez is a 89 y.o. male who presents with signs and symptoms consistent of referring diagnosis based off of subjective/objective findings. Patient presents with pain, decreased strength, decreased ROM, decreased joint mobility, ambulatory dysfunction, and postural dysfunction. Due to these impairments, Patient has difficulty performing recreational activities and engaging in social activities. Of note, patient is most limited with lumbar mobility which has led to impaired activity tolerance and increases in pain. Patient would benefit from a comprehensive HEP focusing on improving said deficits.  Patient was educated on and provided with an at home exercise program this date to be completed. Patient verbalized understanding of the importance of completion. Patient would benefit from skilled physical therapy to address the impairments, improve their level of function, and to improve their overall quality of life. PT POC 2x/wk for 6 weeks. Thank you for this referral.    Impairments: abnormal or restricted ROM, activity intolerance, impaired physical strength, lacks appropriate home exercise program and pain with function    Symptom irritability: lowUnderstanding of Dx/Px/POC: good   Prognosis: good    Goals  Short Term Goals:  to be achieved by 4 weeks  1) Patient to be independent with basic HEP.- met  2) Decrease pain to 4/10 at its worst.- met  3) Increase lumbar spine ROM by 50% in all deficient planes.- met   4) Increase LE strength by 1/2 MMT grade in all deficient planes.-met    Long Term Goals: to be achieved by discharge  1) FOTO equal to or greater than expected outcome.- met  2) Patient to be independent with comprehensive HEP.- progressing  3) Lumbar spine ROM WNL all planes to improve a/iadls.- progressing   4) Increase LE strength to 5/5 MMT grade in all planes to improve a/iadls.- progressing  5) Patient to report no sleep interruption secondary to pain.- progressing  6) Increase seated and ambulatory tolerance to 30 min.- met       Plan  Patient would benefit from: skilled physical therapy  Planned therapy interventions: activity modification, abdominal trunk stabilization, joint mobilization, manual therapy, neuromuscular re-education, patient education, strengthening, stretching, therapeutic activities, therapeutic exercise, home exercise program, functional ROM exercises and body mechanics training  Frequency: 2x week  Duration in weeks: 6  Treatment plan discussed with: patient        Subjective Evaluation    History of Present Illness  Mechanism of injury: History of Current Injury: Patient notes back pain that has been present on and off for roughly 2 years. He reports an exacerbation which led him to see his PCP initially in September. This pain is noted in B/L low back which began to worsen in early December. Patient was given a couple of medications which he notes initially did not help but have since improved his sxs although he only uses them when necessary. Patient notes difficulty with some of his daily tasks and would like to pursue PT in order to improve these.   Pain location/Descriptors: Both sides of the low back  Aggravating factors: walking, lying flat, going from sitting to standing  Easing factors:  Sitting  24 HR pattern: None although he notes increased stiffness in the back in the mornings   Imaging: nothing recently   Special Questions: Cindy denies a new onset of Bladder incontinence, Bowel dysfunction, Dysphagia, Dysarthria, Tingling, Numbness, and Saddle anesthesia .  Patient goals:  improve pain, motion   Hobbies/Interest:   Occupation: retired      Quality of life: good    Patient Goals  Patient goals for therapy: decreased pain, return to sport/leisure activities, increased strength and increased motion    Pain  Current pain ratin  At best pain ratin  At worst pain ratin      Cindy reports a perceived improvement of 50%. Patient notes pain has reduced to 0. Patient continues to note difficulty maneuvering in and out of the chair. Patient has returned to lying on his back for roughly 20 mins without issue. He notes improvements in pain levels overall and overall mobility in his daily activities such as bed negotiation and walking. Patient would like to continue to improve some of his LE and lumbar strength.  Overall, patient has made steady progress toward goals and would benefit from additional PT at this time.       Objective     Palpation   Left   No palpable tenderness to the erector spinae, lumbar paraspinals and quadratus lumborum.     Right   No palpable tenderness to the erector spinae, lumbar paraspinals and quadratus lumborum.     Tenderness     Lumbar Spine  No tenderness in the spinous process, left transverse process and right transverse process.     Left Hip   No tenderness in the PSIS.     Right Hip   No tenderness in the PSIS.     Neurological Testing     Sensation     Lumbar   Left   Intact: light touch    Right   Intact: light touch    Reflexes   Left   Patellar (L4): normal (2+)  Achilles (S1): normal (2+)  Clonus sign: negative    Right   Patellar (L4): normal (2+)  Achilles (S1): normal (2+)  Clonus sign: negative    Strength/Myotome Testing     Left Hip   Planes of  Motion   Flexion: 4+  Abduction: 5  Adduction: 5  External rotation: 4+  Internal rotation: 4+    Right Hip   Planes of Motion   Flexion: 4+  Abduction: 5  Adduction: 5  External rotation: 4+  Internal rotation: 4+    Left Knee   Flexion: 4+  Extension: 5    Right Knee   Flexion: 4+  Extension: 4+    Left Ankle/Foot   Dorsiflexion: 5  Plantar flexion: 4+  Great toe extension: 5    Right Ankle/Foot   Dorsiflexion: 5  Plantar flexion: 5  Great toe extension: 5    Tests     Lumbar     Left   Negative quadrant.     Right   Negative quadrant.     Left Pelvic Girdle/Sacrum   Negative: active SLR test.     Right Pelvic Girdle/Sacrum   Negative: active SLR test.     Left Hip   Positive DON.   Negative FADIR.     Right Hip   Positive DON.   Negative FADIR.                Diagnosis: LBP   Precautions:    POC Expires:   Re-evaluation Date:    FOTO Scores/Date:   Visit Count 1/10 7/10 8/10 9/10 10/10   Manuals 2/12 1/24 1/31 2/2 2/5   Re-eval  EB                                       Ther Ex        Nustep  7' L4  6' L5 6' L5 L5 8'    LTR        BKFO        TrA brace        Posterior pelvic tilts        SLR        SL abduction        Glute bridges        LAQ        Hamstring curls   2x10 2 2x10 3# 2x10 3# 2x10 3#   Trunk twists  15x ea side  15x ea side  10x ea  10x ea 10x ea    Worlds greatest stretch        DKTC        Ball rollouts  10x  10x all directions  20x all directions 20x all directions 20x all directions    Thoracic extensions  30x then standing 20x  10x  20x  20x 20x    Neuro Re-Ed        TrA bracing        Supine tapdowns        Supine marches    Standing 2x10 2#  Standing 2x10 3# Standing 2x10 3#   Hip isometrics         Standing hip abd  2x10 2#  2x10 3# 2x10 3# 2x10 3#    Standing hip ext   2x10 2#  2x10 3#  2x10 3# 2x10 3#   Paloff press    15x gtb seated  20x gtb seated 20x GTB seated   Trunk twists   20x gtb seated  15x gtb seated  20x gtb seated 20x GTB seated   Side steps   3 laps 2# ea  3 laps 3#  3  laps 3# 3 laps 3#                                      Ther Act         Education    On RW, proper use and then practice   RW adjustment and practice      step ups           trx squats           Modalities                                            Patient re-evaluated x30 mins

## 2024-02-14 ENCOUNTER — APPOINTMENT (OUTPATIENT)
Dept: PHYSICAL THERAPY | Facility: CLINIC | Age: 89
End: 2024-02-14
Payer: MEDICARE

## 2024-02-19 ENCOUNTER — OFFICE VISIT (OUTPATIENT)
Dept: PHYSICAL THERAPY | Facility: CLINIC | Age: 89
End: 2024-02-19
Payer: MEDICARE

## 2024-02-19 DIAGNOSIS — M54.50 LUMBAR PAIN: Primary | ICD-10-CM

## 2024-02-19 DIAGNOSIS — M54.50 CHRONIC BILATERAL LOW BACK PAIN WITHOUT SCIATICA: ICD-10-CM

## 2024-02-19 DIAGNOSIS — M43.06 LUMBAR SPONDYLOLYSIS: ICD-10-CM

## 2024-02-19 DIAGNOSIS — G89.29 CHRONIC BILATERAL LOW BACK PAIN WITHOUT SCIATICA: ICD-10-CM

## 2024-02-19 PROCEDURE — 97110 THERAPEUTIC EXERCISES: CPT

## 2024-02-19 PROCEDURE — 97112 NEUROMUSCULAR REEDUCATION: CPT

## 2024-02-19 NOTE — PROGRESS NOTES
"Daily Note     Today's date: 2024  Patient name: Cindy Perez  : 1928  MRN: [id number]  Referring provider: Efrem Keane MD  Dx:   Encounter Diagnosis     ICD-10-CM    1. Lumbar pain  M54.50       2. Chronic bilateral low back pain without sciatica  M54.50     G89.29       3. 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 He finds that his back has been \"pretty good.\"       Objective: See treatment diary below      Assessment: Tolerated treatment well. Therapist progressed loading of lumbar spine this date to patient tolerance. Able to increase amounts of resistance provided to good tolerance. No bouts of increased pain despite progression. Fatigue noted at end of session. Progress as able. Patient would benefit from continued PT      Plan: Continue per plan of care.      Diagnosis: LBP   Precautions:    POC Expires:   Re-evaluation Date:    FOTO Scores/Date:   Visit Count 1/10 2/10 8/10 9/10 10/10   Manuals    Re-eval  EB                                       Ther Ex        Nustep  8' L5  6' L5 6' L5 L5 8'    LTR        BKFO        Squats   20x @ hi-lo table       TrA brace        Posterior pelvic tilts        SLR        SL abduction        Glute bridges        LAQ        Hamstring curls    2x10 3# 2x10 3# 2x10 3#   Trunk twists  15x ea side  15x ea side  10x ea  10x ea 10x ea    Worlds greatest stretch        DKTC        Ball rollouts  10x  10x all directions  20x all directions 20x all directions 20x all directions    Thoracic extensions  30x then standing 20x  20x  20x  20x 20x    Neuro Re-Ed        TrA bracing        Supine tapdowns        Supine marches    Standing 2x10 2#  Standing 2x10 3# Standing 2x10 3#   Hip isometrics         Standing hip abd  2x10 2#  2x10 3# 2x10 3# 2x10 3#    Standing hip ext   2x10 2#  2x10 3#  2x10 3# 2x10 3#   Paloff press   2x10 btb  15x gtb seated  20x gtb seated 20x GTB seated "   Trunk twists   20x btb seated  15x gtb seated  20x gtb seated 20x GTB seated   Side steps    3 laps 3#  3 laps 3# 3 laps 3#                                      Ther Act         Education    On RW, proper use and then practice   RW adjustment and practice      step ups           trx squats           Modalities

## 2024-02-21 ENCOUNTER — OFFICE VISIT (OUTPATIENT)
Dept: PHYSICAL THERAPY | Facility: CLINIC | Age: 89
End: 2024-02-21
Payer: MEDICARE

## 2024-02-21 DIAGNOSIS — M54.50 LUMBAR PAIN: Primary | ICD-10-CM

## 2024-02-21 DIAGNOSIS — M54.50 CHRONIC BILATERAL LOW BACK PAIN WITHOUT SCIATICA: ICD-10-CM

## 2024-02-21 DIAGNOSIS — M43.06 LUMBAR SPONDYLOLYSIS: ICD-10-CM

## 2024-02-21 DIAGNOSIS — G89.29 CHRONIC BILATERAL LOW BACK PAIN WITHOUT SCIATICA: ICD-10-CM

## 2024-02-21 PROCEDURE — 97530 THERAPEUTIC ACTIVITIES: CPT

## 2024-02-21 PROCEDURE — 97110 THERAPEUTIC EXERCISES: CPT

## 2024-02-21 NOTE — PROGRESS NOTES
Daily Note     Today's date: 2024  Patient name: Cindy Perez  : 1928  MRN: [id number]  Referring provider: Efrem Keane MD  Dx:   Encounter Diagnosis     ICD-10-CM    1. Lumbar pain  M54.50       2. Chronic bilateral low back pain without sciatica  M54.50     G89.29       3. 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       Objective: See treatment diary below      Assessment: Tolerated treatment well. Therapist continued to progress patient through functional loading of the lumbar spine. Responded well with no noted discomfort reported. Good tolerance to mobility. Added deadlift and side dips to good effect. Continue to progress loading activities. Progress as able. Patient demonstrated fatigue post treatment and would benefit from continued PT      Plan: Continue per plan of care.      Diagnosis: LBP   Precautions:    POC Expires:   Re-evaluation Date:    FOTO Scores/Date:   Visit Count 1/10 2/10 3/10 9/10 10/10   Manuals    Re-eval  EB                                       Ther Ex        Nustep  8' L5  11' L5 6' L5 L5 8'    LTR        BKFO        Squats   20x @ hi-lo table  20x @ rail      TrA brace        Posterior pelvic tilts        SLR        SL abduction        Glute bridges        LAQ        Hamstring curls     2x10 3# 2x10 3#   Trunk twists  15x ea side  15x ea side  20x ea  10x ea 10x ea    Worlds greatest stretch        DKTC        Ball rollouts  10x  10x all directions  20x all directions 20x all directions 20x all directions    Thoracic extensions  30x then standing 20x  20x  20x  20x 20x    Neuro Re-Ed        TrA bracing        Supine tapdowns        Supine marches     Standing 2x10 3# Standing 2x10 3#   Hip isometrics         Standing hip abd  2x10 2#   2x10 3#  2x10 3#    Standing hip ext   2x10 2#   2x10 3# 2x10 3#   Paloff press   2x10 btb   20x gtb seated 20x GTB seated   Trunk twists   20x btb seated   20x gtb seated 20x GTB seated   Side steps     3 laps 3# 3 laps 3#                                      Ther Act         Education      RW adjustment and practice      step ups          Side dips    2x10 5# kb      Deadlifts    2x10 5# kb       trx squats           Modalities

## 2024-02-26 ENCOUNTER — OFFICE VISIT (OUTPATIENT)
Dept: PHYSICAL THERAPY | Facility: CLINIC | Age: 89
End: 2024-02-26
Payer: MEDICARE

## 2024-02-26 DIAGNOSIS — G89.29 CHRONIC BILATERAL LOW BACK PAIN WITHOUT SCIATICA: ICD-10-CM

## 2024-02-26 DIAGNOSIS — M43.06 LUMBAR SPONDYLOLYSIS: ICD-10-CM

## 2024-02-26 DIAGNOSIS — M54.50 CHRONIC BILATERAL LOW BACK PAIN WITHOUT SCIATICA: ICD-10-CM

## 2024-02-26 DIAGNOSIS — M54.50 LUMBAR PAIN: Primary | ICD-10-CM

## 2024-02-26 PROCEDURE — 97530 THERAPEUTIC ACTIVITIES: CPT

## 2024-02-26 PROCEDURE — 97110 THERAPEUTIC EXERCISES: CPT

## 2024-02-26 PROCEDURE — 97112 NEUROMUSCULAR REEDUCATION: CPT

## 2024-02-26 NOTE — PROGRESS NOTES
Daily Note     Today's date: 2024  Patient name: Cindy Perez  : 1928  MRN: [id number]  Referring provider: Efrem Keane MD  Dx:   Encounter Diagnosis     ICD-10-CM    1. Lumbar pain  M54.50       2. Chronic bilateral low back pain without sciatica  M54.50     G89.29       3. 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 Notes post exercise soreness after the last session.       Objective: See treatment diary below      Assessment: Tolerated treatment well. Therapist continued to progress loading of the lumbar spine. Good tolerance to loading of the back with an ability to increase amounts of resistance without compensation. Progress as able.  Patient would benefit from continued PT      Plan: Continue per plan of care.      Diagnosis: LBP   Precautions:    POC Expires:   Re-evaluation Date:    FOTO Scores/Date:   Visit Count 1/10 2/10 3/10 4/10 10/10   Manuals    Re-eval  EB                                       Ther Ex        Nustep  8' L5  11' L5 10' L5 L5 8'    LTR        BKFO        Squats   20x @ hi-lo table  20x @ rail  20x @ rail     TrA brace        Posterior pelvic tilts        SLR        SL abduction        Glute bridges        LAQ        Hamstring curls      2x10 3#   Trunk twists  15x ea side  15x ea side  20x ea  10x ea 10x ea    Worlds greatest stretch        DKTC        Ball rollouts  10x  10x all directions  20x all directions 20x all directions 20x all directions    Thoracic extensions  30x then standing 20x  20x  20x  20x 20x    Neuro Re-Ed        TrA bracing        Supine tapdowns        Supine marches      Standing 2x10 3#   Hip isometrics         Standing hip abd  2x10 2#   2x10 2x10 3#    Standing hip ext   2x10 2#   2x10  2x10 3#   Paloff press   2x10 btb    20x GTB seated   Trunk twists   20x btb seated    20x GTB seated   Side steps      3 laps 3#                                      Ther  Act         Education      RW adjustment and practice      step ups          Side dips    2x10 5# kb  2x10 7.5# kb     Deadlifts    2x10 5# kb  2x10 7.5# kb      trx squats           Modalities                                            1:1 with PT from [phone]

## 2024-02-28 ENCOUNTER — OFFICE VISIT (OUTPATIENT)
Dept: PHYSICAL THERAPY | Facility: CLINIC | Age: 89
End: 2024-02-28
Payer: MEDICARE

## 2024-02-28 DIAGNOSIS — M54.50 LUMBAR PAIN: Primary | ICD-10-CM

## 2024-02-28 DIAGNOSIS — M54.50 CHRONIC BILATERAL LOW BACK PAIN WITHOUT SCIATICA: ICD-10-CM

## 2024-02-28 DIAGNOSIS — M43.06 LUMBAR SPONDYLOLYSIS: ICD-10-CM

## 2024-02-28 DIAGNOSIS — G89.29 CHRONIC BILATERAL LOW BACK PAIN WITHOUT SCIATICA: ICD-10-CM

## 2024-02-28 PROCEDURE — 97110 THERAPEUTIC EXERCISES: CPT

## 2024-02-28 PROCEDURE — 97112 NEUROMUSCULAR REEDUCATION: CPT

## 2024-02-28 PROCEDURE — 97530 THERAPEUTIC ACTIVITIES: CPT

## 2024-02-28 NOTE — PROGRESS NOTES
Daily Note     Today's date: 2024  Patient name: Cindy Perez  : 1928  MRN: [id number]  Referring provider: Efrem Keane MD  Dx:   Encounter Diagnosis     ICD-10-CM    1. Lumbar pain  M54.50       2. Chronic bilateral low back pain without sciatica  M54.50     G89.29       3. 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       Objective: See treatment diary below      Assessment: Tolerated treatment well. Therapist continued to focus on functional activity tolerance and progression of exercise this date to good benefit. Able to increase amount of weight during deadlifts and dips. No increases in pain reported with patient subjectively reporting improved soreness in the low back. Progress as able. Patient would benefit from continued PT      Plan: Continue per plan of care.      Diagnosis: LBP   Precautions:    POC Expires:   Re-evaluation Date:    FOTO Scores/Date:   Visit Count 1/10 2/10 3/10 4/10 5/10   Manuals    Re-eval  EB                                       Ther Ex        Nustep  8' L5  11' L5 10' L5 L5 8'    LTR        BKFO        Squats   20x @ hi-lo table  20x @ rail  20x @ rail  20x @ rail    TrA brace        Posterior pelvic tilts        SLR        SL abduction        Glute bridges        LAQ        Hamstring curls         Trunk twists  15x ea side  15x ea side  20x ea  10x ea    Worlds greatest stretch        DKTC        Ball rollouts  10x  10x all directions  20x all directions 20x all directions 20x all directions    Thoracic extensions  30x then standing 20x  20x  20x  20x 20x    Neuro Re-Ed        TrA bracing        Supine tapdowns        Standing marches      Standing 2x10 3#   Hip isometrics         Standing hip abd  2x10 2#   2x10 2x10 3#    Standing hip ext   2x10 2#   2x10  2x10 3#   Paloff press   2x10 btb       Trunk twists   20x btb seated       Side  steps      2:30' 3#                                       Ther Act         Education          step ups          Side dips    2x10 5# kb  2x10 7.5# kb  2x10 10# kb   Deadlifts    2x10 5# kb  2x10 7.5# kb  2x10 10# kb     trx squats           Modalities                                            1:1 with PT from [id number]

## 2024-03-13 ENCOUNTER — APPOINTMENT (OUTPATIENT)
Dept: PHYSICAL THERAPY | Facility: CLINIC | Age: 89
End: 2024-03-13
Payer: MEDICARE

## 2024-03-27 ENCOUNTER — EVALUATION (OUTPATIENT)
Dept: PHYSICAL THERAPY | Facility: CLINIC | Age: 89
End: 2024-03-27
Payer: MEDICARE

## 2024-03-27 DIAGNOSIS — M54.50 CHRONIC BILATERAL LOW BACK PAIN WITHOUT SCIATICA: ICD-10-CM

## 2024-03-27 DIAGNOSIS — M43.06 LUMBAR SPONDYLOLYSIS: ICD-10-CM

## 2024-03-27 DIAGNOSIS — G89.29 CHRONIC BILATERAL LOW BACK PAIN WITHOUT SCIATICA: ICD-10-CM

## 2024-03-27 DIAGNOSIS — M54.50 LUMBAR PAIN: Primary | ICD-10-CM

## 2024-03-27 PROCEDURE — 97140 MANUAL THERAPY 1/> REGIONS: CPT

## 2024-03-27 PROCEDURE — 97110 THERAPEUTIC EXERCISES: CPT

## 2024-03-27 NOTE — PROGRESS NOTES
PT Re-Evaluation     Today's date: 3/27/2024  Patient name: Cindy Perez  : 1928  MRN: [id number]  Referring provider: Efrem Keane MD  Dx:   Encounter Diagnosis     ICD-10-CM    1. Lumbar pain  M54.50       2. Chronic bilateral low back pain without sciatica  M54.50     G89.29       3. 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 male who presents with signs and symptoms consistent of referring diagnosis based off of subjective/objective findings. Patient has continued to make good progress since initiating OPPT. Patient has made improvements in LE strength (IR/ER as well as pain free flexion), lumbar ROM (now completely WNL), and functional mobility with decreases in pain which has led to increased activity tolerance. Patient does however continue to present with mildly impaired lumbar mobility and strength which impacts his quality of life and daily function. Due to these impairments, Patient continues to have difficulty performing recreational activities and engaging in social activities. Patient would continue to benefit from a comprehensive HEP focusing on improving said deficits. Patient would benefit from skilled physical therapy to address the impairments, improve their level of function, and to improve their overall quality of life. PT POC 2x/wk for 6 weeks. Thank you for this referral.      From IE: Cindy Perez is a 89 y.o. male who presents with signs and symptoms consistent of referring diagnosis based off of subjective/objective findings. Patient presents with pain, decreased strength, decreased ROM, decreased joint mobility, ambulatory dysfunction, and postural dysfunction. Due to these impairments, Patient has difficulty performing recreational activities and engaging in social activities. Of note, patient is most limited with lumbar mobility which has led to impaired activity tolerance and  increases in pain. Patient would benefit from a comprehensive HEP focusing on improving said deficits.  Patient was educated on and provided with an at home exercise program this date to be completed. Patient verbalized understanding of the importance of completion. Patient would benefit from skilled physical therapy to address the impairments, improve their level of function, and to improve their overall quality of life. PT POC 2x/wk for 6 weeks. Thank you for this referral.    Impairments: abnormal or restricted ROM, activity intolerance, impaired physical strength, lacks appropriate home exercise program and pain with function    Symptom irritability: lowUnderstanding of Dx/Px/POC: good   Prognosis: good    Goals  Short Term Goals: to be achieved by 4 weeks  1) Patient to be independent with basic HEP.- met  2) Decrease pain to 4/10 at its worst.- met  3) Increase lumbar spine ROM by 50% in all deficient planes.- met   4) Increase LE strength by 1/2 MMT grade in all deficient planes.-met    Long Term Goals: to be achieved by discharge  1) FOTO equal to or greater than expected outcome.- met  2) Patient to be independent with comprehensive HEP.- progressing  3) Lumbar spine ROM WNL all planes to improve a/iadls.- met  4) Increase LE strength to 5/5 MMT grade in all planes to improve a/iadls.- progressing  5) Patient to report no sleep interruption secondary to pain.- met  6) Increase seated and ambulatory tolerance to 30 min.- met       Plan  Patient would benefit from: skilled physical therapy  Planned therapy interventions: activity modification, abdominal trunk stabilization, joint mobilization, manual therapy, neuromuscular re-education, patient education, strengthening, stretching, therapeutic activities, therapeutic exercise, home exercise program, functional ROM exercises and body mechanics training  Frequency: 2x week  Duration in weeks: 6  Treatment plan discussed with: patient        Subjective  Evaluation    History of Present Illness  Mechanism of injury: History of Current Injury: Patient notes back pain that has been present on and off for roughly 2 years. He reports an exacerbation which led him to see his PCP initially in September. This pain is noted in B/L low back which began to worsen in early December. Patient was given a couple of medications which he notes initially did not help but have since improved his sxs although he only uses them when necessary. Patient notes difficulty with some of his daily tasks and would like to pursue PT in order to improve these.   Pain location/Descriptors: Both sides of the low back  Aggravating factors: walking, lying flat, going from sitting to standing  Easing factors: Sitting  24 HR pattern: None although he notes increased stiffness in the back in the mornings   Imaging: nothing recently   Special Questions: Cindy denies a new onset of Bladder incontinence, Bowel dysfunction, Dysphagia, Dysarthria, Tingling, Numbness, and Saddle anesthesia .  Patient goals:  improve pain, motion   Hobbies/Interest:   Occupation: retired      Quality of life: good    Patient Goals  Patient goals for therapy: decreased pain, return to sport/leisure activities, increased strength and increased motion    Pain  Current pain ratin  At best pain ratin  At worst pain ratin    Cindy reports a perceived improvement of 60%. Patient notes pain has maintained at a 0 in most points in time.  Patient has improved ability maneuvering in and out of the chair. Patient has continued to lie on his back for roughly 20 mins without issue. He continues to use his cane and finds that after long walking his back pain can increase and is often most stiff in the morning. Patient would like to continue to improve some of his LE mobility and continue to improve his strength.  Overall, patient has made steady progress toward goals and would benefit from additional PT at this time.        Objective     Palpation   Left   No palpable tenderness to the erector spinae, lumbar paraspinals and quadratus lumborum.     Right   No palpable tenderness to the erector spinae, lumbar paraspinals and quadratus lumborum.     Tenderness     Lumbar Spine  No tenderness in the spinous process, left transverse process and right transverse process.     Left Hip   No tenderness in the PSIS.     Right Hip   No tenderness in the PSIS.     Neurological Testing     Sensation     Lumbar   Left   Intact: light touch    Right   Intact: light touch    Reflexes   Left   Patellar (L4): normal (2+)  Achilles (S1): normal (2+)  Clonus sign: negative    Right   Patellar (L4): normal (2+)  Achilles (S1): normal (2+)  Clonus sign: negative    Active Range of Motion     Lumbar   Flexion:  WFL  Extension:  WFL  Left lateral flexion:  WFL  Right lateral flexion:  WFL  Left rotation:  WFL  Right rotation:  WFL    Strength/Myotome Testing     Left Hip   Planes of Motion   Flexion: 4+  Abduction: 5  Adduction: 5  External rotation: 5  Internal rotation: 5    Right Hip   Planes of Motion   Flexion: 4+  Abduction: 5  Adduction: 5  External rotation: 5  Internal rotation: 5    Left Knee   Flexion: 5  Extension: 5    Right Knee   Flexion: 5  Extension: 5    Left Ankle/Foot   Dorsiflexion: 5  Plantar flexion: 5  Great toe extension: 5    Right Ankle/Foot   Dorsiflexion: 5  Plantar flexion: 5  Great toe extension: 5    Tests     Lumbar     Left   Negative quadrant.     Right   Negative quadrant.     Left Pelvic Girdle/Sacrum   Negative: active SLR test.     Right Pelvic Girdle/Sacrum   Negative: active SLR test.     Left Hip   Negative DON and FADIR.     Right Hip   Positive DON.   Negative FADIR.                Diagnosis: LBP   Precautions:    POC Expires:   Re-evaluation Date:    FOTO Scores/Date:   Visit Count 6/10 2/10 3/10 4/10 5/10   Manuals 3/27 2/19 2/21 2/26 2/28   Re-eval  EB                                       Ther Ex         Nustep 5' L5 8' L5  11' L5 10' L5 L5 8'    LTR        BKFO        Squats   20x @ hi-lo table  20x @ rail  20x @ rail  20x @ rail    TrA brace        Posterior pelvic tilts        SLR        SL abduction        Glute bridges        LAQ        Hamstring curls         Trunk twists  15x ea side  15x ea side  20x ea  10x ea    Worlds greatest stretch        DKTC        Ball rollouts  20x  10x all directions  20x all directions 20x all directions 20x all directions    Thoracic extensions  30x then standing 20x  20x  20x  20x 20x    Neuro Re-Ed        TrA bracing        Supine tapdowns        Standing marches      Standing 2x10 3#   Hip isometrics         Standing hip abd  2x10 2#   2x10 2x10 3#    Standing hip ext   2x10 2#   2x10  2x10 3#   Paloff press   2x10 btb       Trunk twists   20x btb seated       Side steps      2:30' 3#                                       Ther Act         Education          step ups          Side dips  2x10 5# in ea hand  2x10 5# kb  2x10 7.5# kb  2x10 10# kb   Deadlifts  2x10 10#   2x10 5# kb  2x10 7.5# kb  2x10 10# kb     trx squats           Modalities                                            Patient re-evaluated x25 mins

## 2024-03-27 NOTE — LETTER
2024    Efrem Keane MD  [address]  [address]  [place] [address]    Patient: Cindy Perez   YOB: 1928   Date of Visit: 3/27/2024     Encounter Diagnosis     ICD-10-CM    1. Lumbar pain  M54.50       2. Chronic bilateral low back pain without sciatica  M54.50     G89.29       3. 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 Keane:    Thank you for your recent referral of Cindy Perez. Please review the attached evaluation summary from Cindy's recent visit.     Please verify that you agree with the plan of care by signing the attached order.     If you have any questions or concerns, please do not hesitate to call.     I sincerely appreciate the opportunity to share in the care of one of your patients and hope to have another opportunity to work with you in the near future.       Sincerely,    Patrice Mccullough, PT      Referring Provider:      I certify that I have read the below Plan of Care and certify the need for these services furnished under this plan of treatment while under my care.                    Efrem Keane MD  [address]  [address]  [place] [address]  Via Fax: [phone]          PT Re-Evaluation     Today's date: 3/27/2024  Patient name: Cindy Perez  : 1928  MRN: [id number]  Referring provider: Efrem Keane MD  Dx:   Encounter Diagnosis     ICD-10-CM    1. Lumbar pain  M54.50       2. Chronic bilateral low back pain without sciatica  M54.50     G89.29       3. 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 male who presents with signs and symptoms consistent of referring diagnosis based off of subjective/objective findings. Patient has continued to make good progress since initiating OPPT. Patient has made improvements in LE strength (IR/ER as well as pain free flexion), lumbar ROM (now completely WNL), and functional mobility with decreases in pain which has led  to increased activity tolerance. Patient does however continue to present with mildly impaired lumbar mobility and strength which impacts his quality of life and daily function. Due to these impairments, Patient continues to have difficulty performing recreational activities and engaging in social activities. Patient would continue to benefit from a comprehensive HEP focusing on improving said deficits. Patient would benefit from skilled physical therapy to address the impairments, improve their level of function, and to improve their overall quality of life. PT POC 2x/wk for 6 weeks. Thank you for this referral.      From : Cindy Perez is a 89 y.o. male who presents with signs and symptoms consistent of referring diagnosis based off of subjective/objective findings. Patient presents with pain, decreased strength, decreased ROM, decreased joint mobility, ambulatory dysfunction, and postural dysfunction. Due to these impairments, Patient has difficulty performing recreational activities and engaging in social activities. Of note, patient is most limited with lumbar mobility which has led to impaired activity tolerance and increases in pain. Patient would benefit from a comprehensive HEP focusing on improving said deficits.  Patient was educated on and provided with an at home exercise program this date to be completed. Patient verbalized understanding of the importance of completion. Patient would benefit from skilled physical therapy to address the impairments, improve their level of function, and to improve their overall quality of life. PT POC 2x/wk for 6 weeks. Thank you for this referral.    Impairments: abnormal or restricted ROM, activity intolerance, impaired physical strength, lacks appropriate home exercise program and pain with function    Symptom irritability: lowUnderstanding of Dx/Px/POC: good   Prognosis: good    Goals  Short Term Goals: to be achieved by 4 weeks  1) Patient to be independent  with basic HEP.- met  2) Decrease pain to 4/10 at its worst.- met  3) Increase lumbar spine ROM by 50% in all deficient planes.- met   4) Increase LE strength by 1/2 MMT grade in all deficient planes.-met    Long Term Goals: to be achieved by discharge  1) FOTO equal to or greater than expected outcome.- met  2) Patient to be independent with comprehensive HEP.- progressing  3) Lumbar spine ROM WNL all planes to improve a/iadls.- met  4) Increase LE strength to 5/5 MMT grade in all planes to improve a/iadls.- progressing  5) Patient to report no sleep interruption secondary to pain.- met  6) Increase seated and ambulatory tolerance to 30 min.- met       Plan  Patient would benefit from: skilled physical therapy  Planned therapy interventions: activity modification, abdominal trunk stabilization, joint mobilization, manual therapy, neuromuscular re-education, patient education, strengthening, stretching, therapeutic activities, therapeutic exercise, home exercise program, functional ROM exercises and body mechanics training  Frequency: 2x week  Duration in weeks: 6  Treatment plan discussed with: patient        Subjective Evaluation    History of Present Illness  Mechanism of injury: History of Current Injury: Patient notes back pain that has been present on and off for roughly 2 years. He reports an exacerbation which led him to see his PCP initially in September. This pain is noted in B/L low back which began to worsen in early December. Patient was given a couple of medications which he notes initially did not help but have since improved his sxs although he only uses them when necessary. Patient notes difficulty with some of his daily tasks and would like to pursue PT in order to improve these.   Pain location/Descriptors: Both sides of the low back  Aggravating factors: walking, lying flat, going from sitting to standing  Easing factors: Sitting  24 HR pattern: None although he notes increased stiffness in the  back in the mornings   Imaging: nothing recently   Special Questions: Cindy denies a new onset of Bladder incontinence, Bowel dysfunction, Dysphagia, Dysarthria, Tingling, Numbness, and Saddle anesthesia .  Patient goals:  improve pain, motion   Hobbies/Interest:   Occupation: retired      Quality of life: good    Patient Goals  Patient goals for therapy: decreased pain, return to sport/leisure activities, increased strength and increased motion    Pain  Current pain ratin  At best pain ratin  At worst pain ratin    Cindy reports a perceived improvement of 60%. Patient notes pain has maintained at a 0 in most points in time.  Patient has improved ability maneuvering in and out of the chair. Patient has continued to lie on his back for roughly 20 mins without issue. He continues to use his cane and finds that after long walking his back pain can increase and is often most stiff in the morning. Patient would like to continue to improve some of his LE mobility and continue to improve his strength.  Overall, patient has made steady progress toward goals and would benefit from additional PT at this time.       Objective     Palpation   Left   No palpable tenderness to the erector spinae, lumbar paraspinals and quadratus lumborum.     Right   No palpable tenderness to the erector spinae, lumbar paraspinals and quadratus lumborum.     Tenderness     Lumbar Spine  No tenderness in the spinous process, left transverse process and right transverse process.     Left Hip   No tenderness in the PSIS.     Right Hip   No tenderness in the PSIS.     Neurological Testing     Sensation     Lumbar   Left   Intact: light touch    Right   Intact: light touch    Reflexes   Left   Patellar (L4): normal (2+)  Achilles (S1): normal (2+)  Clonus sign: negative    Right   Patellar (L4): normal (2+)  Achilles (S1): normal (2+)  Clonus sign: negative    Active Range of Motion     Lumbar   Flexion:  WFL  Extension:  WFL  Left  lateral flexion:  WFL  Right lateral flexion:  WFL  Left rotation:  WFL  Right rotation:  WFL    Strength/Myotome Testing     Left Hip   Planes of Motion   Flexion: 4+  Abduction: 5  Adduction: 5  External rotation: 5  Internal rotation: 5    Right Hip   Planes of Motion   Flexion: 4+  Abduction: 5  Adduction: 5  External rotation: 5  Internal rotation: 5    Left Knee   Flexion: 5  Extension: 5    Right Knee   Flexion: 5  Extension: 5    Left Ankle/Foot   Dorsiflexion: 5  Plantar flexion: 5  Great toe extension: 5    Right Ankle/Foot   Dorsiflexion: 5  Plantar flexion: 5  Great toe extension: 5    Tests     Lumbar     Left   Negative quadrant.     Right   Negative quadrant.     Left Pelvic Girdle/Sacrum   Negative: active SLR test.     Right Pelvic Girdle/Sacrum   Negative: active SLR test.     Left Hip   Negative DON and FADIR.     Right Hip   Positive DON.   Negative FADIR.                Diagnosis: LBP   Precautions:    POC Expires:   Re-evaluation Date:    FOTO Scores/Date:   Visit Count 6/10 2/10 3/10 4/10 5/10   Manuals 3/27 2/19 2/21 2/26 2/28   Re-eval  EB                                       Ther Ex        Nustep 5' L5 8' L5  11' L5 10' L5 L5 8'    LTR        BKFO        Squats   20x @ hi-lo table  20x @ rail  20x @ rail  20x @ rail    TrA brace        Posterior pelvic tilts        SLR        SL abduction        Glute bridges        LAQ        Hamstring curls         Trunk twists  15x ea side  15x ea side  20x ea  10x ea    Worlds greatest stretch        DKTC        Ball rollouts  20x  10x all directions  20x all directions 20x all directions 20x all directions    Thoracic extensions  30x then standing 20x  20x  20x  20x 20x    Neuro Re-Ed        TrA bracing        Supine tapdowns        Standing marches      Standing 2x10 3#   Hip isometrics         Standing hip abd  2x10 2#   2x10 2x10 3#    Standing hip ext   2x10 2#   2x10  2x10 3#   Paloff press   2x10 btb       Trunk twists   20x btb seated        Side steps      2:30' 3#                                       Ther Act         Education          step ups          Side dips  2x10 5# in ea hand  2x10 5# kb  2x10 7.5# kb  2x10 10# kb   Deadlifts  2x10 10#   2x10 5# kb  2x10 7.5# kb  2x10 10# kb     trx squats           Modalities                                            Patient re-evaluated x25 mins

## 2024-04-10 ENCOUNTER — OFFICE VISIT (OUTPATIENT)
Dept: PHYSICAL THERAPY | Facility: CLINIC | Age: 89
End: 2024-04-10
Payer: MEDICARE

## 2024-04-10 DIAGNOSIS — G89.29 CHRONIC BILATERAL LOW BACK PAIN WITHOUT SCIATICA: ICD-10-CM

## 2024-04-10 DIAGNOSIS — M43.06 LUMBAR SPONDYLOLYSIS: ICD-10-CM

## 2024-04-10 DIAGNOSIS — M54.50 LUMBAR PAIN: Primary | ICD-10-CM

## 2024-04-10 DIAGNOSIS — M54.50 CHRONIC BILATERAL LOW BACK PAIN WITHOUT SCIATICA: ICD-10-CM

## 2024-04-10 PROCEDURE — 97110 THERAPEUTIC EXERCISES: CPT

## 2024-04-10 PROCEDURE — 97112 NEUROMUSCULAR REEDUCATION: CPT

## 2024-04-10 PROCEDURE — 97530 THERAPEUTIC ACTIVITIES: CPT

## 2024-04-10 NOTE — PROGRESS NOTES
Daily Note     Today's date: 4/10/2024  Patient name: Cindy Perez  : 1928  MRN: [id number]  Referring provider: Efrem Keane MD  Dx:   Encounter Diagnosis     ICD-10-CM    1. Lumbar pain  M54.50       2. Chronic bilateral low back pain without sciatica  M54.50     G89.29       3. 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 Has been able to tolerate sleeping on his back more consistently.       Objective: See treatment diary below      Assessment: Tolerated treatment well. Therapist continued to progress and focus on patients lumbar mobility and LE strength. Added hip abd/ext again to tolerance levels with patient showing no increases in pain. Fatigue noted by end of session. Progress as able.  Patient would benefit from continued PT      Plan: Continue per plan of care.      Diagnosis: LBP   Precautions:    POC Expires:   Re-evaluation Date:    FOTO Scores/Date:   Visit Count 6/10 1/10 3/10 4/10 5/10   Manuals 3/27 4/10 2/21 2/26 2/28   Re-eval  EB                                       Ther Ex        Nustep 5' L5 8' L5  11' L5 10' L5 L5 8'    LTR        BKFO        Squats    20x @ rail  20x @ rail  20x @ rail    TrA brace        Posterior pelvic tilts        SLR        SL abduction        Glute bridges        LAQ        Hamstring curls         Trunk twists  15x ea side   20x ea  10x ea    Worlds greatest stretch        DKTC        Ball rollouts  20x   20x all directions 20x all directions 20x all directions    Thoracic extensions  30x then standing 20x  20x  20x  20x 20x    Neuro Re-Ed        TrA bracing        Supine tapdowns        Standing marches      Standing 2x10 3#   Hip isometrics         Standing hip abd  2x10 2#   2x10 2x10 3#    Standing hip ext   2x10 2#   2x10  2x10 3#   Paloff press         Trunk twists         Side steps   2 laps 2#     2:30' 3#    Standing marches   2x10 2#                                  Ther Act         Education          step ups          Side dips  2x10 5# in ea hand 20x 5# kb in ea hand  2x10 5# kb  2x10 7.5# kb  2x10 10# kb   Deadlifts  2x10 10#   2x10 5# kb  2x10 7.5# kb  2x10 10# kb     trx squats           Modalities

## 2024-04-24 ENCOUNTER — OFFICE VISIT (OUTPATIENT)
Dept: PHYSICAL THERAPY | Facility: CLINIC | Age: 89
End: 2024-04-24
Payer: MEDICARE

## 2024-04-24 DIAGNOSIS — M54.50 CHRONIC BILATERAL LOW BACK PAIN WITHOUT SCIATICA: ICD-10-CM

## 2024-04-24 DIAGNOSIS — M54.50 LUMBAR PAIN: Primary | ICD-10-CM

## 2024-04-24 DIAGNOSIS — G89.29 CHRONIC BILATERAL LOW BACK PAIN WITHOUT SCIATICA: ICD-10-CM

## 2024-04-24 DIAGNOSIS — M43.06 LUMBAR SPONDYLOLYSIS: ICD-10-CM

## 2024-04-24 PROCEDURE — 97112 NEUROMUSCULAR REEDUCATION: CPT

## 2024-04-24 PROCEDURE — 97530 THERAPEUTIC ACTIVITIES: CPT

## 2024-04-24 PROCEDURE — 97110 THERAPEUTIC EXERCISES: CPT

## 2024-04-24 NOTE — PROGRESS NOTES
Daily Note     Today's date: 2024  Patient name: Cindy Perez  : 1928  MRN: [id number]  Referring provider: Efrem Keane MD  Dx:   Encounter Diagnosis     ICD-10-CM    1. Lumbar pain  M54.50       2. Chronic bilateral low back pain without sciatica  M54.50     G89.29       3. 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 Patient feels like PT is still required so that he can continue to improve his function and not worsen.       Objective: See treatment diary below      Assessment: Tolerated treatment well. Therapist continued to focus on improving patient functional strength and overall mobility of the lumbar spine. Challenged with standing exercises with fatigue evident. Discussed possibility of adding balance to POC but would require referral beforehand. Progress as able. Patient would benefit from continued PT      Plan: Continue per plan of care.      Diagnosis: LBP   Precautions:    POC Expires:   Re-evaluation Date:    FOTO Scores/Date:   Visit Count 6/10 1/10 2/10 4/10 5/10   Manuals 3/27 4/10 4/24 2/26 2/28   Re-eval  EB                                       Ther Ex        Nustep 5' L5 8' L5  8' L5 10' L5 L5 8'    LTR        BKFO        Squats    20x @ rail  20x @ rail  20x @ rail    TrA brace        Posterior pelvic tilts        SLR        SL abduction        Glute bridges        LAQ        Hamstring curls         Trunk twists  15x ea side   20x ea  10x ea    Worlds greatest stretch        DKTC        Ball rollouts  20x   20x all directions 20x all directions 20x all directions    Thoracic extensions  30x then standing 20x  20x  20x  20x 20x    Neuro Re-Ed        TrA bracing        Supine tapdowns        Standing marches      Standing 2x10 3#   Hip isometrics         Standing hip abd  2x10 2#  20x  2x10 2x10 3#    Standing hip ext    2x10 2#  20x  2x10  2x10 3#   Paloff press         Trunk twists         Side steps   2 laps 2#    2:30' 3#    Standing marches   2x10 2#  20x                                 Ther Act         Education          step ups          Side dips  2x10 5# in ea hand 20x 5# kb in ea hand  2x10 10# kb  2x10 7.5# kb  2x10 10# kb   Deadlifts  2x10 10#   2x10 10# kb  2x10 7.5# kb  2x10 10# kb     trx squats           Modalities                                            1:1 with PT from [id number]

## 2024-05-09 ENCOUNTER — EVALUATION (OUTPATIENT)
Dept: PHYSICAL THERAPY | Facility: CLINIC | Age: 89
End: 2024-05-09
Payer: MEDICARE

## 2024-05-09 DIAGNOSIS — G89.29 CHRONIC BILATERAL LOW BACK PAIN WITHOUT SCIATICA: ICD-10-CM

## 2024-05-09 DIAGNOSIS — M43.06 LUMBAR SPONDYLOLYSIS: ICD-10-CM

## 2024-05-09 DIAGNOSIS — M54.50 CHRONIC BILATERAL LOW BACK PAIN WITHOUT SCIATICA: ICD-10-CM

## 2024-05-09 DIAGNOSIS — M54.50 LUMBAR PAIN: Primary | ICD-10-CM

## 2024-05-09 PROCEDURE — 97110 THERAPEUTIC EXERCISES: CPT

## 2024-05-09 PROCEDURE — 97140 MANUAL THERAPY 1/> REGIONS: CPT

## 2024-05-09 NOTE — PROGRESS NOTES
PT Re-Evaluation     Today's date: 2024  Patient name: Cindy Perez  : 1928  MRN: [id number]  Referring provider: Efrem Keane MD  Dx:   Encounter Diagnosis     ICD-10-CM    1. Lumbar pain  M54.50       2. Chronic bilateral low back pain without sciatica  M54.50     G89.29       3. 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 Patient is being transitioned to skilled maintenance and goals will be adjusted to reflect this change in treatment focus. Patient will require the skilled care of a therapist to carry out these interventions because of his diagnoses and the need for the skills of a therapist to safely and effectively carry out the complex treatment required including constant sequencing and assistance levels to safely challenge the patients strength, balance, and gait. Without the skills of the PT in providing these necessary services the patient would experience regression in strength, balance, gait, and activity intolerance while increasing fall risk.       From IE: Cindy Perez is a 89 y.o. male who presents with signs and symptoms consistent of referring diagnosis based off of subjective/objective findings. Patient presents with pain, decreased strength, decreased ROM, decreased joint mobility, ambulatory dysfunction, and postural dysfunction. Due to these impairments, Patient has difficulty performing recreational activities and engaging in social activities. Of note, patient is most limited with lumbar mobility which has led to impaired activity tolerance and increases in pain. Patient would benefit from a comprehensive HEP focusing on improving said deficits.  Patient was educated on and provided with an at home exercise program this date to be completed.  Patient verbalized understanding of the importance of completion. Patient would benefit from skilled physical therapy to address the impairments, improve their level of function, and to improve their overall quality of life. PT POC 2x/wk for 6 weeks. Thank you for this referral.    Impairments: abnormal or restricted ROM, activity intolerance, impaired physical strength, lacks appropriate home exercise program and pain with function  Understanding of Dx/Px/POC: good   Prognosis: good    Goals  Short Term Goals: to be achieved by 4 weeks  1) Patient to be independent with basic HEP.- met  2) Decrease pain to 4/10 at its worst.- met  3) Increase lumbar spine ROM by 50% in all deficient planes.- met   4) Increase LE strength by 1/2 MMT grade in all deficient planes.-met    Long Term Goals: to be achieved by discharge  1) FOTO equal to or greater than expected outcome.- met  2) Patient to be independent with comprehensive HEP.- met  3) Lumbar spine ROM WNL all planes to improve a/iadls.- met  4) Increase LE strength to 5/5 MMT grade in all planes to improve a/iadls.- met   5) Patient to report no sleep interruption secondary to pain.- met  6) Increase seated and ambulatory tolerance to 30 min.- met     New Maintenance Goals:  1) FOTO will maintain its score.   2) Patient will maintain TUG score of 23 seconds.   3) Patient will maintain 28 seconds with 5x STS score     Plan  Patient would benefit from: skilled physical therapy  Planned therapy interventions: activity modification, abdominal trunk stabilization, joint mobilization, manual therapy, neuromuscular re-education, patient education, strengthening, stretching, therapeutic activities, therapeutic exercise, home exercise program, functional ROM exercises and body mechanics training  Frequency: 2x month  Duration in weeks: 6  Treatment plan discussed with: patient        Subjective Evaluation    History of Present Illness  Mechanism of injury: History of Current  Injury: Patient notes back pain that has been present on and off for roughly 2 years. He reports an exacerbation which led him to see his PCP initially in September. This pain is noted in B/L low back which began to worsen in early December. Patient was given a couple of medications which he notes initially did not help but have since improved his sxs although he only uses them when necessary. Patient notes difficulty with some of his daily tasks and would like to pursue PT in order to improve these.   Pain location/Descriptors: Both sides of the low back  Aggravating factors: walking, lying flat, going from sitting to standing  Easing factors: Sitting  24 HR pattern: None although he notes increased stiffness in the back in the mornings   Imaging: nothing recently   Special Questions: Cindy denies a new onset of Bladder incontinence, Bowel dysfunction, Dysphagia, Dysarthria, Tingling, Numbness, and Saddle anesthesia .  Patient goals:  improve pain, motion   Hobbies/Interest:   Occupation: retired      Quality of life: good    Patient Goals  Patient goals for therapy: decreased pain, return to sport/leisure activities, increased strength and increased motion    Pain  Current pain ratin  At best pain ratin  At worst pain ratin      Cindy reports a perceived improvement of 65-70%. Patient notes sleeping has been good with little to no issue. Patient does report bouts of unsteadiness which associated with fatigue. He finds that his back mobility could also use some improvement. He does note sporadic challenges with his back and would like to continue to get stronger and not regress. Overall, patient has made steady progress toward goals and would benefit from additional PT at this time.       Objective     Palpation   Left   No palpable tenderness to the erector spinae, lumbar paraspinals and quadratus lumborum.     Right   No palpable tenderness to the erector spinae, lumbar paraspinals and quadratus  lumborum.     Tenderness     Lumbar Spine  No tenderness in the spinous process, left transverse process and right transverse process.     Left Hip   No tenderness in the PSIS.     Right Hip   No tenderness in the PSIS.     Neurological Testing     Sensation     Lumbar   Left   Intact: light touch    Right   Intact: light touch    Reflexes   Left   Patellar (L4): normal (2+)  Achilles (S1): normal (2+)  Clonus sign: negative    Right   Patellar (L4): normal (2+)  Achilles (S1): normal (2+)  Clonus sign: negative    Active Range of Motion     Lumbar   Flexion:  WFL  Extension:  WFL  Left lateral flexion:  WFL  Right lateral flexion:  WFL  Left rotation:  WFL    Strength/Myotome Testing     Left Hip   Planes of Motion   Flexion: 4+  Abduction: 5  Adduction: 5  External rotation: 5  Internal rotation: 5    Right Hip   Planes of Motion   Flexion: 4+  Abduction: 5  Adduction: 5  External rotation: 5  Internal rotation: 5    Left Knee   Flexion: 5  Extension: 5    Right Knee   Flexion: 5  Extension: 5    Left Ankle/Foot   Dorsiflexion: 5  Plantar flexion: 5  Great toe extension: 5    Right Ankle/Foot   Dorsiflexion: 5  Plantar flexion: 5  Great toe extension: 5    Tests     Lumbar     Left   Negative quadrant.     Right   Negative quadrant.     Left Pelvic Girdle/Sacrum   Negative: active SLR test.     Right Pelvic Girdle/Sacrum   Negative: active SLR test.     Left Hip   Negative DON and FADIR.     Right Hip   Negative DON and FADIR.     General Comments:      Lumbar Comments  5x STS: 28 seconds use of UE    TU seconds use of SPC               Diagnosis: LBP   Precautions:    POC Expires:   Re-evaluation Date:    FOTO Scores/Date:   Visit Count 6/10 1/10 2/10 3/10 5/10   Manuals 3/27 4/10 4/24 5/9 2/28   Re-eval  EB   EB                                    Ther Ex        Nustep 5' L5 8' L5  8' L5 8' L5 L5 8'    LTR        BKFO        Squats    20x @ rail   20x @ rail    TrA brace        Posterior pelvic tilts         SLR        SL abduction        Glute bridges        LAQ        Hamstring curls         Trunk twists  15x ea side   20x ea  20x ea    Worlds greatest stretch        DKTC        Ball rollouts  20x   20x all directions 20x all directions 20x all directions    Thoracic extensions  30x then standing 20x  20x  20x  20x 20x    Neuro Re-Ed        TrA bracing        Supine tapdowns        Standing marches      Standing 2x10 3#   Hip isometrics         Standing hip abd  2x10 2#  20x  2x10 2x10 3#    Standing hip ext   2x10 2#  20x  2x10  2x10 3#   Paloff press         Trunk twists         Side steps   2 laps 2#    2:30' 3#    Standing marches   2x10 2#  20x                                 Ther Act         Education          step ups          Side dips  2x10 5# in ea hand 20x 5# kb in ea hand  2x10 10# kb  2x10 7.5# kb  2x10 10# kb   Deadlifts  2x10 10#   2x10 10# kb  2x10 7.5# kb  2x10 10# kb     trx squats           Modalities                                              Patient re-evaluated x25 mins

## 2024-05-09 NOTE — LETTER
May 9, 2024    Efrem Keane MD  [address]  [address]  [facility] [address]    Patient: Cindy Perez   YOB: 1928   Date of Visit: 2024     Encounter Diagnosis     ICD-10-CM    1. Lumbar pain  M54.50       2. Chronic bilateral low back pain without sciatica  M54.50     G89.29       3. 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 Keane:    Thank you for your recent referral of Cindy Perez. Please review the attached evaluation summary from Cindy's recent visit.     Please verify that you agree with the plan of care by signing the attached order.     If you have any questions or concerns, please do not hesitate to call.     I sincerely appreciate the opportunity to share in the care of one of your patients and hope to have another opportunity to work with you in the near future.       Sincerely,    Patrice Mccullough, PT      Referring Provider:      I certify that I have read the below Plan of Care and certify the need for these services furnished under this plan of treatment while under my care.                    Efrem Keane MD  [address]  [address]  [facility] [address]  Via Fax: [phone]          PT Re-Evaluation     Today's date: 2024  Patient name: Cindy Perez  : 1928  MRN: [id number]  Referring provider: Efrem Keane MD  Dx:   Encounter Diagnosis     ICD-10-CM    1. Lumbar pain  M54.50       2. Chronic bilateral low back pain without sciatica  M54.50     G89.29       3. 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 Patient is being transitioned to skilled maintenance and goals will be adjusted to reflect this change in treatment focus. Patient will require the skilled care of a therapist to carry out these interventions because of his  diagnoses and the need for the skills of a therapist to safely and effectively carry out the complex treatment required including constant sequencing and assistance levels to safely challenge the patients strength, balance, and gait. Without the skills of the PT in providing these necessary services the patient would experience regression in strength, balance, gait, and activity intolerance while increasing fall risk.       From IE: Cindy Perez is a 89 y.o. male who presents with signs and symptoms consistent of referring diagnosis based off of subjective/objective findings. Patient presents with pain, decreased strength, decreased ROM, decreased joint mobility, ambulatory dysfunction, and postural dysfunction. Due to these impairments, Patient has difficulty performing recreational activities and engaging in social activities. Of note, patient is most limited with lumbar mobility which has led to impaired activity tolerance and increases in pain. Patient would benefit from a comprehensive HEP focusing on improving said deficits.  Patient was educated on and provided with an at home exercise program this date to be completed. Patient verbalized understanding of the importance of completion. Patient would benefit from skilled physical therapy to address the impairments, improve their level of function, and to improve their overall quality of life. PT POC 2x/wk for 6 weeks. Thank you for this referral.    Impairments: abnormal or restricted ROM, activity intolerance, impaired physical strength, lacks appropriate home exercise program and pain with function  Understanding of Dx/Px/POC: good   Prognosis: good    Goals  Short Term Goals: to be achieved by 4 weeks  1) Patient to be independent with basic HEP.- met  2) Decrease pain to 4/10 at its worst.- met  3) Increase lumbar spine ROM by 50% in all deficient planes.- met   4) Increase LE strength by 1/2 MMT grade in all deficient planes.-met    Long Term Goals: to  be achieved by discharge  1) FOTO equal to or greater than expected outcome.- met  2) Patient to be independent with comprehensive HEP.- met  3) Lumbar spine ROM WNL all planes to improve a/iadls.- met  4) Increase LE strength to 5/5 MMT grade in all planes to improve a/iadls.- met   5) Patient to report no sleep interruption secondary to pain.- met  6) Increase seated and ambulatory tolerance to 30 min.- met     New Maintenance Goals:  1) FOTO will maintain its score.   2) Patient will maintain TUG score of 23 seconds.   3) Patient will maintain 28 seconds with 5x STS score     Plan  Patient would benefit from: skilled physical therapy  Planned therapy interventions: activity modification, abdominal trunk stabilization, joint mobilization, manual therapy, neuromuscular re-education, patient education, strengthening, stretching, therapeutic activities, therapeutic exercise, home exercise program, functional ROM exercises and body mechanics training  Frequency: 2x month  Duration in weeks: 6  Treatment plan discussed with: patient        Subjective Evaluation    History of Present Illness  Mechanism of injury: History of Current Injury: Patient notes back pain that has been present on and off for roughly 2 years. He reports an exacerbation which led him to see his PCP initially in September. This pain is noted in B/L low back which began to worsen in early December. Patient was given a couple of medications which he notes initially did not help but have since improved his sxs although he only uses them when necessary. Patient notes difficulty with some of his daily tasks and would like to pursue PT in order to improve these.   Pain location/Descriptors: Both sides of the low back  Aggravating factors: walking, lying flat, going from sitting to standing  Easing factors: Sitting  24 HR pattern: None although he notes increased stiffness in the back in the mornings   Imaging: nothing recently   Special Questions:  Cindy denies a new onset of Bladder incontinence, Bowel dysfunction, Dysphagia, Dysarthria, Tingling, Numbness, and Saddle anesthesia .  Patient goals:  improve pain, motion   Hobbies/Interest:   Occupation: retired      Quality of life: good    Patient Goals  Patient goals for therapy: decreased pain, return to sport/leisure activities, increased strength and increased motion    Pain  Current pain ratin  At best pain ratin  At worst pain ratin      Cindy reports a perceived improvement of 65-70%. Patient notes sleeping has been good with little to no issue. Patient does report bouts of unsteadiness which associated with fatigue. He finds that his back mobility could also use some improvement. He does note sporadic challenges with his back and would like to continue to get stronger and not regress. Overall, patient has made steady progress toward goals and would benefit from additional PT at this time.       Objective     Palpation   Left   No palpable tenderness to the erector spinae, lumbar paraspinals and quadratus lumborum.     Right   No palpable tenderness to the erector spinae, lumbar paraspinals and quadratus lumborum.     Tenderness     Lumbar Spine  No tenderness in the spinous process, left transverse process and right transverse process.     Left Hip   No tenderness in the PSIS.     Right Hip   No tenderness in the PSIS.     Neurological Testing     Sensation     Lumbar   Left   Intact: light touch    Right   Intact: light touch    Reflexes   Left   Patellar (L4): normal (2+)  Achilles (S1): normal (2+)  Clonus sign: negative    Right   Patellar (L4): normal (2+)  Achilles (S1): normal (2+)  Clonus sign: negative    Active Range of Motion     Lumbar   Flexion:  WFL  Extension:  WFL  Left lateral flexion:  WFL  Right lateral flexion:  WFL  Left rotation:  WFL    Strength/Myotome Testing     Left Hip   Planes of Motion   Flexion: 4+  Abduction: 5  Adduction: 5  External rotation:  5  Internal rotation: 5    Right Hip   Planes of Motion   Flexion: 4+  Abduction: 5  Adduction: 5  External rotation: 5  Internal rotation: 5    Left Knee   Flexion: 5  Extension: 5    Right Knee   Flexion: 5  Extension: 5    Left Ankle/Foot   Dorsiflexion: 5  Plantar flexion: 5  Great toe extension: 5    Right Ankle/Foot   Dorsiflexion: 5  Plantar flexion: 5  Great toe extension: 5    Tests     Lumbar     Left   Negative quadrant.     Right   Negative quadrant.     Left Pelvic Girdle/Sacrum   Negative: active SLR test.     Right Pelvic Girdle/Sacrum   Negative: active SLR test.     Left Hip   Negative DON and FADIR.     Right Hip   Negative DON and FADIR.     General Comments:      Lumbar Comments  5x STS: 28 seconds use of UE    TU seconds use of SPC               Diagnosis: LBP   Precautions:    POC Expires:   Re-evaluation Date:    FOTO Scores/Date:   Visit Count 6/10 1/10 2/10 3/10 5/10   Manuals 3/27 4/10 4/24 5/9 2/28   Re-eval  EB   EB                                    Ther Ex        Nustep 5' L5 8' L5  8' L5 8' L5 L5 8'    LTR        BKFO        Squats    20x @ rail   20x @ rail    TrA brace        Posterior pelvic tilts        SLR        SL abduction        Glute bridges        LAQ        Hamstring curls         Trunk twists  15x ea side   20x ea  20x ea    Worlds greatest stretch        DKTC        Ball rollouts  20x   20x all directions 20x all directions 20x all directions    Thoracic extensions  30x then standing 20x  20x  20x  20x 20x    Neuro Re-Ed        TrA bracing        Supine tapdowns        Standing marches      Standing 2x10 3#   Hip isometrics         Standing hip abd  2x10 2#  20x  2x10 2x10 3#    Standing hip ext   2x10 2#  20x  2x10  2x10 3#   Paloff press         Trunk twists         Side steps   2 laps 2#    2:30' 3#    Standing marches   2x10 2#  20x                                 Ther Act         Education          step ups          Side dips  2x10 5# in ea hand 20x 5# kb in  ea hand  2x10 10# kb  2x10 7.5# kb  2x10 10# kb   Deadlifts  2x10 10#   2x10 10# kb  2x10 7.5# kb  2x10 10# kb     trx squats           Modalities                                              Patient re-evaluated x25 mins

## 2024-05-23 ENCOUNTER — APPOINTMENT (OUTPATIENT)
Dept: PHYSICAL THERAPY | Facility: CLINIC | Age: 89
End: 2024-05-23
Payer: MEDICARE

## 2024-09-09 DIAGNOSIS — N40.1 BPH WITH OBSTRUCTION/LOWER URINARY TRACT SYMPTOMS: ICD-10-CM

## 2024-09-09 DIAGNOSIS — N13.8 BPH WITH OBSTRUCTION/LOWER URINARY TRACT SYMPTOMS: ICD-10-CM

## 2024-09-09 RX ORDER — TAMSULOSIN HYDROCHLORIDE 0.4 MG/1
0.4 CAPSULE ORAL
Qty: 90 CAPSULE | Refills: 1 | Status: SHIPPED | OUTPATIENT
Start: 2024-09-09

## 2024-09-09 NOTE — TELEPHONE ENCOUNTER
Reason for call:   [x] Refill   [] Prior Auth  [] Other:     Office:   [] PCP/Provider -   [x] Specialty/Provider - Urology    Medication: Tamsulosin 0.4 mg, take 1 capsule by mouth daily with dinner       Pharmacy: [facility] [place] [facility]     Does the patient have enough for 3 days?   [x] Yes   [] No - Send as HP to POD

## 2025-01-10 NOTE — PROGRESS NOTES
Progress Note - Urology  Joaquim Garcia 80 y o  male MRN: [id number]  Encounter: [id number]      Chief Complaint:   Chief Complaint   Patient presents with    Urinary Frequency       HPI:    This is a 19-year-old male who was seen in May for routine prostate check  He now has some increasing urinary symptoms of urgency when he feels the need to void  He denies frequency but when he has to void and attempts to go to the bathroom he may have difficulty with the urine flow starting as he is opening his zipper  He denies any difficulty with the flow  He has some a m  Frequency  Nocturia is 1-2 times  No dysuria no hematuria    MEDS:    Current Outpatient Medications:     amLODIPine (NORVASC) 5 mg tablet, Take 5 mg by mouth every morning , Disp: , Rfl:     ascorbic acid (VITAMIN C) 500 mg tablet, Take 500 mg by mouth daily  , Disp: , Rfl:     atenolol (TENORMIN) 50 mg tablet, Take 50 mg by mouth every morning , Disp: , Rfl:     betamethasone valerate (VALISONE) 0 1 % cream, Apply topically 2 (two) times a day, Disp: 45 g, Rfl: 1    calcium-vitamin D (OSCAL) 250-125 MG-UNIT per tablet, Take 1 tablet by mouth daily  , Disp: , Rfl:     fluticasone (FLONASE) 50 mcg/act nasal spray, 1 spray into each nostril daily, Disp: , Rfl:     GARLIC PO, Take 1 tablet by mouth daily  , Disp: , Rfl:     levothyroxine 75 mcg tablet, Take 75 mcg by mouth daily  , Disp: , Rfl:     Magnesium 400 MG CAPS, Take 1 tablet by mouth , Disp: , Rfl:     Multiple Vitamins-Minerals (CENTRUM ADULTS PO), Take 1 tablet by mouth daily  , Disp: , Rfl:     Omega-3 Fatty Acids (OMEGA-3 FISH OIL) 1200 MG CAPS, Take by mouth, Disp: , Rfl:     vitamin E, tocopherol, 400 units capsule, Take 400 Units by mouth daily  , Disp: , Rfl:     clotrimazole-betamethasone (LOTRISONE) 1-0 05 % cream, Apply topically 2 (two) times a day for 5 days, Disp: 30 g, Rfl: 1      PMH:  Past Medical History:   Diagnosis Date    Arthritis     BPH (benign prostatic SUBJECTIVE:   Gunner Paul is a 49 y.o. male has a past medical history significant for high cholesterol, high triglycerides, bipolar disorder, chronic low back pain and obesity.  Patient presents today for virtual visit via video [employer] messaging.  At previous visit the patient had been noted to have a significant increase in his triglycerides.  He has been more diligent with his diet.  He reports no chest pain, shortness of breath, orthopnea or PND.  GI and  review of systems is unremarkable.  Patient is back pain seems to be doing okay he states.  He is been evaluated in the past by orthopedics.    Patient's vital signs were not performed as encounter was performed virtually.  Location of virtual visit was patient's home.  '    HPI  See above    Past Medical History, Past Surgical History, Family history, Social History, and Medications were all reviewed with the patient today and updated as necessary.       Current Outpatient Medications   Medication Sig Dispense Refill    rosuvastatin (CRESTOR) 10 MG tablet TAKE 1 TABLET BY MOUTH EVERY NIGHT 90 tablet 3    fenofibrate (TRICOR) 145 MG tablet TAKE 1 TABLET BY MOUTH DAILY 90 tablet 3    omeprazole (PRILOSEC) 40 MG delayed release capsule Take 1 capsule by mouth daily 90 capsule 3    QUEtiapine (SEROQUEL XR) 400 MG extended release tablet 1 tablet      amphetamine-dextroamphetamine (ADDERALL) 20 MG tablet TAKE 1 TABLET BY MOUTH EVERY DAY AT 2 PM      amphetamine-dextroamphetamine (ADDERALL XR) 20 MG extended release capsule Take 1 capsule by mouth.      clonazePAM (KLONOPIN) 1 MG tablet TK 1 T PO QID      cloNIDine (CATAPRES) 0.1 MG tablet TAKE 1 TABLET BY MOUTH TWICE DAILY AS NEEDED FOR ANXIETY      tadalafil (CIALIS) 20 MG tablet Take 1 tablet by mouth as needed      zolpidem (AMBIEN) 10 MG tablet TK 1 T PO QHS PRN FOR SLEEP       No current facility-administered medications for this visit.     Allergies   Allergen Reactions    Penicillins Rash  hyperplasia)     Disease of thyroid gland     Hypertension     Spinal stenosis          PSH  Past Surgical History:   Procedure Laterality Date    CATARACT EXTRACTION, BILATERAL      JOINT REPLACEMENT      KNEE SURGERY  in 2001    ID REPAIR ING HERNIA,5+Y/O,REDUCIBL Left 3/9/2016    Procedure: REPAIR HERNIA INGUINAL WITH MESH;  Surgeon: Clifford Chua MD;  [facility]: [facility];  Service: Urology    X-STOP IMPLANTATION           FH  Family History   Problem Relation Age of Onset    Cancer Father         Bladder    Stroke Sister         SH  Social History     Socioeconomic History    Marital status: /Civil Union     Spouse name: None    Number of children: None    Years of education: None    Highest education level: None   Occupational History    Occupation: Retired   Social Needs    Financial resource strain: None    Food insecurity:     Worry: None     Inability: None    Transportation needs:     Medical: None     Non-medical: None   Tobacco Use    Smoking status: Never Smoker    Smokeless tobacco: Never Used   Substance and Sexual Activity    Alcohol use:  Yes     Alcohol/week: 7 0 standard drinks     Types: 7 Cans of beer per week    Drug use: No    Sexual activity: None   Lifestyle    Physical activity:     Days per week: None     Minutes per session: None    Stress: None   Relationships    Social connections:     Talks on phone: None     Gets together: None     Attends Yazidi service: None     Active member of club or organization: None     Attends meetings of clubs or organizations: None     Relationship status: None    Intimate partner violence:     Fear of current or ex partner: None     Emotionally abused: None     Physically abused: None     Forced sexual activity: None   Other Topics Concern    None   Social History Narrative    None          ROS:  Review of Systems      Vitals:  Blood pressure 124/70, pulse 64, height 5' 8" (1 727 m), weight 85 3 kg (188 lb)       Physical Exam:    elderly male appearing his stated age in no obvious distress  The abdomen is soft  Not appreciating bladder distention  Previous prostate examination showed a gland about 30 g without nodularity  Lab, Imaging and other studies:  No results found for this or any previous visit (from the past 672 hour(s))  IMPRESSION:  1  BPH  2  Urinary urgency frequency with mild leakage    PLAN:  Options were reviewed at this time 1  Continued observation 2  Attempt to improve his Kegel training to increase his muscle tension 3  Would be consideration of an anticholinergic medication to reduce the urgency when voiding  Side effects were reviewed including constipation and dry mouth  After consideration of these options patient has decided to continue with observation and attempt to do the Kegel exercises to improve muscle tone  We will continue with his regular follow-up and discuss further at that time if he wishes to proceed        Please note :  Voice dictation software has been used to create this document  There may be inadvertent transcription errors

## 2025-04-15 ENCOUNTER — TELEPHONE (OUTPATIENT)
Age: OVER 89
End: 2025-04-15

## 2025-04-15 NOTE — TELEPHONE ENCOUNTER
German called from [facility] [phone]    Patient wants to transfer his care to Dr. Choudhary  Patient lives at  [facility]    Patient also needs to be scheduled for TCM.    Please call German @ [facility] to schedule the TCM Thank you.    Please advise, thank you.

## 2025-04-28 PROBLEM — E03.9 ACQUIRED HYPOTHYROIDISM: Status: ACTIVE | Noted: 2025-04-28

## 2025-04-28 PROBLEM — M17.0 PRIMARY OSTEOARTHRITIS OF BOTH KNEES: Status: ACTIVE | Noted: 2025-04-28

## 2025-04-29 ENCOUNTER — OFFICE VISIT (OUTPATIENT)
Dept: GERIATRICS | Facility: OTHER | Age: OVER 89
End: 2025-04-29
Payer: COMMERCIAL

## 2025-04-29 VITALS
TEMPERATURE: 98 F | HEART RATE: 54 BPM | BODY MASS INDEX: 28.35 KG/M2 | DIASTOLIC BLOOD PRESSURE: 70 MMHG | SYSTOLIC BLOOD PRESSURE: 118 MMHG | HEIGHT: 69 IN | OXYGEN SATURATION: 98 % | WEIGHT: 191.4 LBS

## 2025-04-29 DIAGNOSIS — E03.9 ACQUIRED HYPOTHYROIDISM: ICD-10-CM

## 2025-04-29 DIAGNOSIS — N40.0 BENIGN PROSTATIC HYPERPLASIA, UNSPECIFIED WHETHER LOWER URINARY TRACT SYMPTOMS PRESENT: ICD-10-CM

## 2025-04-29 DIAGNOSIS — I10 PRIMARY HYPERTENSION: Primary | ICD-10-CM

## 2025-04-29 DIAGNOSIS — M17.0 PRIMARY OSTEOARTHRITIS OF BOTH KNEES: ICD-10-CM

## 2025-04-29 DIAGNOSIS — M54.30 SCIATICA, UNSPECIFIED LATERALITY: ICD-10-CM

## 2025-04-29 PROCEDURE — 99215 OFFICE O/P EST HI 40 MIN: CPT | Performed by: NURSE PRACTITIONER

## 2025-04-29 RX ORDER — TRIAMCINOLONE ACETONIDE 1 MG/G
1 CREAM TOPICAL 2 TIMES DAILY
COMMUNITY
Start: 2025-03-17

## 2025-04-29 NOTE — ASSESSMENT & PLAN NOTE
History of bilateral knee replacements  Increased pain of bilateral knees and gait dysfunction reported  Will consult PT to evaluate and treat  Tylenol 650 mg po tid ordered for pain  Recommend topical analgesics as needed

## 2025-04-29 NOTE — PATIENT INSTRUCTIONS
Miralax 17 gms po daily prn constipation  Tylenol 325 mg tablet, take 2 tablets, 650 mg three times daily for pain  PT to evaluate/treat for bilateral knee pain and balance disorder.   UAC&S dx: urinary frequency  CMP, CBC with diff, TSH level with free t4 level Dx: hypothyroidism, HTN. Vitamin D level Dx: osteoarthritis and Vitamin B-12 level Dx: B-12 deficiency in one week.   Monitor BP and HR daily times one week with printout.   Follow-up in 3 months

## 2025-04-29 NOTE — ASSESSMENT & PLAN NOTE
TSH level 3.18 02/2025  Continue levothyroxine 112 mcg po daily   Will repeat TSH with free T4 level

## 2025-04-29 NOTE — ASSESSMENT & PLAN NOTE
Currently stable, no sciatica reported  PT ordered for bilateral knee pain and gait dysfunction/imbalance.

## 2025-04-29 NOTE — ASSESSMENT & PLAN NOTE
Episode of urinary incontinence land frequency last night  Will obtain UAC&S to rule out infection  CBC with diff and CMP ordered  Continue tamsulosin  Follow-up with Urology as scheduled.

## 2025-04-29 NOTE — ASSESSMENT & PLAN NOTE
Goal BP <140-150/90; avoid hypotension  BP currently 118/70 and at goal  HR low at 54 today  Will monitor bp and hr daily times one week with printout  If HR continues to be low, will consider decreasing vs discontinuing atenolol.  For now continue atenolol and amlodipine  Metabolic panel ordered to monitor electrolytes and renal function

## 2025-04-29 NOTE — PROGRESS NOTES
[facility]   [address] [place] [address]  [place]  [id number] [phone]    Primary Care at [facility]    ASSESSMENT AND PLAN:  1. Primary hypertension  Assessment & Plan:  Goal BP <140-150/90; avoid hypotension  BP currently 118/70 and at goal  HR low at 54 today  Will monitor bp and hr daily times one week with printout  If HR continues to be low, will consider decreasing vs discontinuing atenolol.  For now continue atenolol and amlodipine  Metabolic panel ordered to monitor electrolytes and renal function  2. Acquired hypothyroidism  Assessment & Plan:  TSH level 3.18 2025  Continue levothyroxine 112 mcg po daily   Will repeat TSH with free T4 level  3. Primary osteoarthritis of both knees  Assessment & Plan:  History of bilateral knee replacements  Increased pain of bilateral knees and gait dysfunction reported  Will consult PT to evaluate and treat  Tylenol 650 mg po tid ordered for pain  Recommend topical analgesics as needed  4. Benign prostatic hyperplasia, unspecified whether lower urinary tract symptoms present  Assessment & Plan:  Episode of urinary incontinence land frequency last night  Will obtain UAC&S to rule out infection  CBC with diff and CMP ordered  Continue tamsulosin  Follow-up with Urology as scheduled.   5. Sciatica, unspecified laterality  Assessment & Plan:  Currently stable, no sciatica reported  PT ordered for bilateral knee pain and gait dysfunction/imbalance.         Name: Cindy Perez                 : 1928               Sex: male    HPI:    89-year-old patient with com-morbidities that include hypothyroidism, BPH, hypertension, osteoarthritis of bilateral knees. He is seen and examined today to establish care. He states that he is upset because he had a urinary accident last night. He states that he never had issues with incontinence at night time in the past.  He also endorses chronic bilateral knee pain and difficulty ambulating due to this.   No recent falls or injury reported. He has a history of bilateral knee replacements.     The following portions of the patient's history were reviewed and updated as appropriate: allergies, current medications, past family history, past medical history, past social history, past surgical history and problem list.    ROS: Review of Systems   Constitutional:  Negative for appetite change, fever and unexpected weight change.   HENT:  Negative for congestion.    Respiratory:  Negative for cough, chest tightness, shortness of breath and wheezing.    Cardiovascular:  Negative for chest pain, palpitations and leg swelling.   Gastrointestinal:  Negative for abdominal distention, abdominal pain and constipation.        Occasional constipation   Genitourinary:  Positive for frequency. Negative for difficulty urinating.        Frequency and incontinence reported last night   Musculoskeletal:  Positive for arthralgias, gait problem and myalgias. Negative for joint swelling.        Bilateral knee pain   Skin:  Negative for color change and rash.   Neurological:  Negative for dizziness and headaches.   Psychiatric/Behavioral:  Negative for confusion, dysphoric mood and sleep disturbance. The patient is not nervous/anxious.        Allergies   Allergen Reactions    Penicillins Other (See Comments)     Unknown       Medications:    Current Outpatient Medications on File Prior to Visit   Medication Sig Dispense Refill    amLODIPine (NORVASC) 5 mg tablet Take 5 mg by mouth every morning.      atenolol (TENORMIN) 50 mg tablet Take 50 mg by mouth every morning.      Multiple Vitamins-Minerals (CENTRUM ADULTS PO) Take 1 tablet by mouth daily.      Synthroid 112 MCG tablet       tamsulosin (FLOMAX) 0.4 mg Take 1 capsule (0.4 mg total) by mouth daily with dinner 90 capsule 1    triamcinolone (KENALOG) 0.1 % cream Apply 1 Application topically 2 (two) times a day      ascorbic acid (VITAMIN C) 500 mg tablet Take 500 mg by mouth daily.  (Patient not taking: Reported on 4/29/2025)      Cholecalciferol 25 MCG (1000 UT) tablet Take 1,000 Units by mouth daily (Patient not taking: Reported on 4/29/2025)      Diclofenac Sodium (VOLTAREN) 1 % Apply 1 application topically 4 (four) times a day (Patient not taking: Reported on 4/29/2025)      levothyroxine 75 mcg tablet Take 75 mcg by mouth daily (Patient not taking: Reported on 4/29/2025)      vitamin E, tocopherol, 400 units capsule Take 400 Units by mouth daily. (Patient not taking: Reported on 4/29/2025)      [DISCONTINUED] betamethasone valerate (VALISONE) 0.1 % cream Apply topically 2 (two) times a day (Patient not taking: Reported on 4/29/2025) 15 g 1    [DISCONTINUED] calcium-vitamin D (OSCAL) 250-125 MG-UNIT per tablet Take 1 tablet by mouth daily. (Patient not taking: Reported on 4/29/2025)      [DISCONTINUED] clotrimazole-betamethasone (LOTRISONE) 1-0.05 % cream Apply topically 2 (two) times a day for 5 days (Patient not taking: Reported on 4/29/2025) 30 g 1    [DISCONTINUED] doxycycline hyclate (VIBRAMYCIN) 100 mg capsule Take 100 mg by mouth 2 (two) times a day (Patient not taking: Reported on 4/29/2025)      [DISCONTINUED] fluticasone (FLONASE) 50 mcg/act nasal spray 1 spray into each nostril daily As needed  (Patient not taking: Reported on 4/29/2025)      [DISCONTINUED] GARLIC PO Take 1 tablet by mouth daily. (Patient not taking: Reported on 4/29/2025)      [DISCONTINUED] Magnesium 400 MG CAPS Take 1 tablet by mouth. (Patient not taking: Reported on 4/29/2025)      [DISCONTINUED] nabumetone (RELAFEN) 500 mg tablet Take 500 mg by mouth 2 (two) times a day (Patient not taking: Reported on 4/29/2025)      [DISCONTINUED] Omega-3 Fatty Acids (OMEGA-3 FISH OIL) 1200 MG CAPS Take by mouth (Patient not taking: Reported on 4/29/2025)      [DISCONTINUED] tamsulosin (FLOMAX) 0.4 mg Take 1 capsule (0.4 mg total) by mouth daily with dinner (Patient not taking: Reported on 4/29/2025) 90 capsule 3     No  current facility-administered medications on file prior to visit.       History:  Past Medical History:   Diagnosis Date    Arthritis     BPH (benign prostatic hyperplasia)     Disease of thyroid gland     Hypertension     Spinal stenosis      Past Surgical History:   Procedure Laterality Date    ADENOIDECTOMY      CATARACT EXTRACTION, BILATERAL      JOINT REPLACEMENT      KNEE SURGERY  in 2001    MS RPR 1ST INGUN HRNA AGE 5 YRS/> REDUCIBLE Left 3/9/2016    Procedure: REPAIR HERNIA INGUINAL WITH MESH;  Surgeon: Pete Lucero MD;  [facility]: [facility];  Service: Urology    SINUS SURGERY      THYROID SURGERY      TONSILLECTOMY      X-STOP IMPLANTATION       Family History   Problem Relation Age of Onset    Cancer Father         Bladder    Stroke Sister      Social History     Socioeconomic History    Marital status: /Civil Union     Spouse name: Not on file    Number of children: Not on file    Years of education: Not on file    Highest education level: Not on file   Occupational History    Occupation: Retired   Tobacco Use    Smoking status: Never    Smokeless tobacco: Never   Vaping Use    Vaping status: Never Used   Substance and Sexual Activity    Alcohol use: Yes     Alcohol/week: 7.0 standard drinks of alcohol     Types: 7 Cans of beer per week    Drug use: No    Sexual activity: Not on file   Other Topics Concern    Not on file   Social History Narrative    Not on file     Social Drivers of Health     Financial Resource Strain: Not on file   Food Insecurity: Not on file   Transportation Needs: Not on file   Physical Activity: Not on file   Stress: Not on file   Social Connections: Not on file   Intimate Partner Violence: Not on file   Housing Stability: Not on file     Past Surgical History:   Procedure Laterality Date    ADENOIDECTOMY      CATARACT EXTRACTION, BILATERAL      JOINT REPLACEMENT      KNEE SURGERY  in 2001    MS RPR 1ST INGUN HRNA AGE 5 YRS/> REDUCIBLE Left 3/9/2016    Procedure: REPAIR  "HERNIA INGUINAL WITH MESH;  Surgeon: Pete Lucero MD;  [facility];  Service: Urology    SINUS SURGERY      THYROID SURGERY      TONSILLECTOMY      X-STOP IMPLANTATION         OBJECTIVE:  Vitals:    04/29/25 0934   BP: 118/70   BP Location: Left arm   Patient Position: Sitting   Cuff Size: Standard   Pulse: (!) 54   Temp: 98 °F (36.7 °C)   TempSrc: Temporal   SpO2: 98%   Weight: 86.8 kg (191 lb 6.4 oz)   Height: 5' 9\" (1.753 m)     Body mass index is 28.26 kg/m².  Physical Exam  Vitals and nursing note reviewed.   Constitutional:       General: He is not in acute distress.     Appearance: Normal appearance. He is not ill-appearing, toxic-appearing or diaphoretic.   HENT:      Head: Normocephalic and atraumatic.      Right Ear: Tympanic membrane, ear canal and external ear normal. There is no impacted cerumen.      Left Ear: Tympanic membrane, ear canal and external ear normal. There is no impacted cerumen.      Ears:      Comments: Excessive cerumen of bilateral ears      Nose: Nose normal. No congestion or rhinorrhea.      Mouth/Throat:      Mouth: Mucous membranes are moist.      Pharynx: Oropharynx is clear. No oropharyngeal exudate or posterior oropharyngeal erythema.   Eyes:      General: No scleral icterus.        Right eye: No discharge.         Left eye: No discharge.      Extraocular Movements: Extraocular movements intact.      Conjunctiva/sclera: Conjunctivae normal.      Pupils: Pupils are equal, round, and reactive to light.   Cardiovascular:      Rate and Rhythm: Regular rhythm. Bradycardia present.      Pulses: Normal pulses.   Pulmonary:      Effort: Pulmonary effort is normal. No respiratory distress.      Breath sounds: Normal breath sounds. No wheezing, rhonchi or rales.   Abdominal:      General: Bowel sounds are normal. There is no distension.      Palpations: Abdomen is soft. There is no mass.      Tenderness: There is no abdominal tenderness. There is no guarding. " "  Musculoskeletal:         General: No tenderness. Normal range of motion.      Cervical back: Normal range of motion and neck supple.      Right lower leg: No edema.      Left lower leg: No edema.   Skin:     General: Skin is warm and dry.      Comments: Healing rash of bilateral arms   Neurological:      General: No focal deficit present.      Mental Status: He is alert and oriented to person, place, and time. Mental status is at baseline.      Cranial Nerves: No cranial nerve deficit.      Sensory: No sensory deficit.      Motor: No weakness.      Gait: Gait abnormal.   Psychiatric:         Mood and Affect: Mood normal.         Behavior: Behavior normal.         Thought Content: Thought content normal.         Labs & Imaging:  Lab Results   Component Value Date    WBC 5.81 11/15/2019    HGB 12.9 11/15/2019    HCT 38.0 11/15/2019     (H) 11/15/2019     11/15/2019     Lab Results   Component Value Date    SODIUM 138 02/11/2025    K 4.4 02/11/2025     02/11/2025    CO2 27 02/11/2025    BUN 14 02/11/2025    CREATININE 0.72 02/11/2025    GLUC 103 (H) 02/11/2025    CALCIUM 9 02/11/2025     Lab Results   Component Value Date    MIQPEBDD15 767 03/03/2022     Lab Results   Component Value Date    OZZ5SGJGUFEP 3.170 11/15/2019    TSH 3.18 02/11/2025     No results found for: \"[id number]\", \"[id number]\"     JOHNATHAN Stiles  Geriatric Medicine  04/29/2025    I have spent a total time of 50 minutes in caring for this patient on the day of the visit/encounter including Diagnostic results, Risks and benefits of tx options, Instructions for management, Patient and family education, Risk factor reductions, Counseling / Coordination of care, Documenting in the medical record, Reviewing/placing orders in the medical record (including tests, medications, and/or procedures), Obtaining or reviewing history  , and Communicating with other healthcare professionals .   "

## 2025-07-24 ENCOUNTER — IN HOME VISIT (OUTPATIENT)
Dept: GERIATRICS | Facility: OTHER | Age: OVER 89
End: 2025-07-24
Payer: COMMERCIAL

## 2025-07-24 DIAGNOSIS — M47.816 LUMBAR SPONDYLOSIS: ICD-10-CM

## 2025-07-24 DIAGNOSIS — M54.50 ACUTE BILATERAL LOW BACK PAIN WITHOUT SCIATICA: Primary | ICD-10-CM

## 2025-07-24 PROCEDURE — 99348 HOME/RES VST EST LOW MDM 30: CPT | Performed by: FAMILY MEDICINE

## 2025-07-27 ENCOUNTER — APPOINTMENT (EMERGENCY)
Dept: CT IMAGING | Facility: HOSPITAL | Age: OVER 89
End: 2025-07-27
Payer: COMMERCIAL

## 2025-07-27 ENCOUNTER — HOSPITAL ENCOUNTER (EMERGENCY)
Facility: HOSPITAL | Age: OVER 89
Discharge: HOME | End: 2025-07-28
Attending: EMERGENCY MEDICINE | Admitting: EMERGENCY MEDICINE
Payer: COMMERCIAL

## 2025-07-27 ENCOUNTER — APPOINTMENT (EMERGENCY)
Dept: RADIOLOGY | Facility: HOSPITAL | Age: OVER 89
End: 2025-07-27
Payer: COMMERCIAL

## 2025-07-27 ENCOUNTER — TELEPHONE (OUTPATIENT)
Dept: OTHER | Facility: OTHER | Age: OVER 89
End: 2025-07-27

## 2025-07-27 VITALS
TEMPERATURE: 98 F | DIASTOLIC BLOOD PRESSURE: 65 MMHG | HEART RATE: 53 BPM | WEIGHT: 192.9 LBS | HEIGHT: 69 IN | BODY MASS INDEX: 28.57 KG/M2 | RESPIRATION RATE: 16 BRPM | SYSTOLIC BLOOD PRESSURE: 145 MMHG | OXYGEN SATURATION: 95 %

## 2025-07-27 DIAGNOSIS — M79.605 MUSCULOSKELETAL PAIN OF LOWER EXTREMITY, LEFT: ICD-10-CM
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
## 2025-07-27 PROCEDURE — 99284 EMERGENCY DEPT VISIT MOD MDM: CPT | Performed by: EMERGENCY MEDICINE

## 2025-07-27 PROCEDURE — 73502 X-RAY EXAM HIP UNI 2-3 VIEWS: CPT

## 2025-07-27 PROCEDURE — 72192 CT PELVIS W/O DYE: CPT

## 2025-07-27 PROCEDURE — 99284 EMERGENCY DEPT VISIT MOD MDM: CPT

## 2025-07-27 RX ORDER — MELOXICAM 7.5 MG/1
7.5 TABLET ORAL DAILY
Qty: 20 TABLET | Refills: 0 | Status: SHIPPED | OUTPATIENT
Start: 2025-07-27 | End: 2025-07-27

## 2025-07-27 RX ORDER — MELOXICAM 7.5 MG/1
7.5 TABLET ORAL DAILY
Status: DISCONTINUED | OUTPATIENT
Start: 2025-07-27 | End: 2025-07-28 | Stop reason: HOSPADM

## 2025-07-27 RX ORDER — ACETAMINOPHEN 325 MG/1
650 TABLET ORAL ONCE
Status: DISCONTINUED | OUTPATIENT
Start: 2025-07-27 | End: 2025-07-27

## 2025-07-27 RX ORDER — MELOXICAM 7.5 MG/1
7.5 TABLET ORAL DAILY
Qty: 20 TABLET | Refills: 0 | Status: SHIPPED | OUTPATIENT
Start: 2025-07-27

## 2025-07-27 RX ORDER — ACETAMINOPHEN 325 MG/1
650 TABLET ORAL ONCE
Status: COMPLETED | OUTPATIENT
Start: 2025-07-27 | End: 2025-07-27

## 2025-07-27 RX ADMIN — ACETAMINOPHEN 650 MG: 325 TABLET ORAL at 19:43

## 2025-07-29 ENCOUNTER — OFFICE VISIT (OUTPATIENT)
Dept: GERIATRICS | Facility: OTHER | Age: OVER 89
End: 2025-07-29
Payer: COMMERCIAL

## 2025-07-29 ENCOUNTER — TELEPHONE (OUTPATIENT)
Age: OVER 89
End: 2025-07-29

## 2025-07-29 VITALS
HEIGHT: 69 IN | SYSTOLIC BLOOD PRESSURE: 130 MMHG | DIASTOLIC BLOOD PRESSURE: 70 MMHG | TEMPERATURE: 97.8 F | BODY MASS INDEX: 27.85 KG/M2 | HEART RATE: 56 BPM | WEIGHT: 188 LBS | OXYGEN SATURATION: 96 %

## 2025-07-29 DIAGNOSIS — M54.42 ACUTE LEFT-SIDED LOW BACK PAIN WITH LEFT-SIDED SCIATICA: Primary | ICD-10-CM

## 2025-07-29 DIAGNOSIS — K59.03 DRUG-INDUCED CONSTIPATION: ICD-10-CM

## 2025-07-29 PROCEDURE — 99214 OFFICE O/P EST MOD 30 MIN: CPT | Performed by: NURSE PRACTITIONER

## 2025-07-29 RX ORDER — GABAPENTIN 100 MG/1
100 CAPSULE ORAL
Start: 2025-07-29 | End: 2025-07-29 | Stop reason: SDUPTHER

## 2025-07-29 RX ORDER — POLYETHYLENE GLYCOL 3350 17 G/17G
17 POWDER, FOR SOLUTION ORAL EVERY OTHER DAY
Start: 2025-07-29

## 2025-07-29 RX ORDER — GABAPENTIN 100 MG/1
100 CAPSULE ORAL
Qty: 30 CAPSULE | Refills: 0 | Status: SHIPPED | OUTPATIENT
Start: 2025-07-29

## 2025-07-30 ENCOUNTER — IN HOME VISIT (OUTPATIENT)
Dept: GERIATRICS | Facility: OTHER | Age: OVER 89
End: 2025-07-30
Payer: COMMERCIAL

## 2025-08-03 PROBLEM — M54.50 ACUTE LOW BACK PAIN: Status: ACTIVE | Noted: 2025-07-29

## 2025-08-03 PROBLEM — M47.816 LUMBAR SPONDYLOSIS: Status: ACTIVE | Noted: 2025-08-03
